# Patient Record
Sex: MALE | Race: WHITE | NOT HISPANIC OR LATINO | Employment: FULL TIME | URBAN - METROPOLITAN AREA
[De-identification: names, ages, dates, MRNs, and addresses within clinical notes are randomized per-mention and may not be internally consistent; named-entity substitution may affect disease eponyms.]

---

## 2018-01-05 ENCOUNTER — APPOINTMENT (OUTPATIENT)
Dept: GENERAL RADIOLOGY | Facility: HOSPITAL | Age: 58
End: 2018-01-05

## 2018-01-05 ENCOUNTER — HOSPITAL ENCOUNTER (EMERGENCY)
Facility: HOSPITAL | Age: 58
Discharge: LEFT BEFORE DISCHARGE | End: 2018-01-05

## 2018-01-05 VITALS
BODY MASS INDEX: 27.4 KG/M2 | OXYGEN SATURATION: 97 % | DIASTOLIC BLOOD PRESSURE: 74 MMHG | HEIGHT: 69 IN | TEMPERATURE: 96.7 F | SYSTOLIC BLOOD PRESSURE: 114 MMHG | WEIGHT: 185 LBS | RESPIRATION RATE: 16 BRPM | HEART RATE: 86 BPM

## 2018-01-05 PROCEDURE — 93010 ELECTROCARDIOGRAM REPORT: CPT | Performed by: INTERNAL MEDICINE

## 2018-01-05 PROCEDURE — 99211 OFF/OP EST MAY X REQ PHY/QHP: CPT

## 2018-01-05 PROCEDURE — 93005 ELECTROCARDIOGRAM TRACING: CPT

## 2018-01-05 RX ORDER — CLOPIDOGREL BISULFATE 75 MG/1
75 TABLET ORAL DAILY
COMMUNITY
End: 2020-02-07

## 2018-01-06 NOTE — ED TRIAGE NOTES
"Pt states \"it worked it's way out.\"  Pt describes having large bowel movement.  Pt states feeling much better and that he has decided to leave without being seen.  "

## 2019-09-23 ENCOUNTER — HOSPITAL ENCOUNTER (EMERGENCY)
Facility: HOSPITAL | Age: 59
Discharge: HOME | End: 2019-09-23
Attending: EMERGENCY MEDICINE | Admitting: EMERGENCY MEDICINE

## 2019-09-23 VITALS
WEIGHT: 190 LBS | RESPIRATION RATE: 16 BRPM | TEMPERATURE: 98.2 F | OXYGEN SATURATION: 97 % | BODY MASS INDEX: 28.14 KG/M2 | HEART RATE: 87 BPM | DIASTOLIC BLOOD PRESSURE: 84 MMHG | SYSTOLIC BLOOD PRESSURE: 121 MMHG | HEIGHT: 69 IN

## 2019-09-23 DIAGNOSIS — F32.A DEPRESSION, UNSPECIFIED DEPRESSION TYPE: Primary | ICD-10-CM

## 2019-09-23 DIAGNOSIS — Z86.59 HISTORY OF DEPRESSION: ICD-10-CM

## 2019-09-23 LAB
AMPHET+METHAMPHET UR QL: POSITIVE
BARBITURATES UR QL SCN: NEGATIVE
BENZODIAZ UR QL SCN: POSITIVE
CANNABINOIDS SERPL QL: NEGATIVE
COCAINE UR QL: NEGATIVE
ETHANOL BLD-MCNC: <10 MG/DL (ref 0–10)
ETHANOL UR QL: <0.01 %
METHADONE UR QL SCN: NEGATIVE
OPIATES UR QL: NEGATIVE
OXYCODONE UR QL SCN: NEGATIVE

## 2019-09-23 PROCEDURE — 80307 DRUG TEST PRSMV CHEM ANLYZR: CPT | Performed by: EMERGENCY MEDICINE

## 2019-09-23 PROCEDURE — 99283 EMERGENCY DEPT VISIT LOW MDM: CPT

## 2019-09-23 PROCEDURE — 90791 PSYCH DIAGNOSTIC EVALUATION: CPT | Performed by: SOCIAL WORKER

## 2019-09-23 PROCEDURE — 36415 COLL VENOUS BLD VENIPUNCTURE: CPT

## 2019-09-23 NOTE — ED NOTES
Pt arrives for a psych eval due to increasing panic attacks and depression. Pt denies SI HI.      Marybeth Thomas, RN  09/23/19 1124

## 2019-09-23 NOTE — DISCHARGE INSTRUCTIONS
Follow-up with IOP as discussed as scheduled with the mental health team today.  Continue all current medications.  Follow-up with your primary care provider as needed.  Return to the emergency department for worsening symptoms including but not limited to developing thoughts or plan of self-harm or harm to others.

## 2019-09-23 NOTE — ED PROVIDER NOTES
EMERGENCY DEPARTMENT ENCOUNTER    CHIEF COMPLAINT  Chief Complaint: anxiety and depression  History given by: pt  History limited by: nothing  Room Number: 10/10  PMD: Higin[facility]ozano [facility]    HPI:  Pt is a 58 y.o. male who presents to the ED via private vehicle complaining of worsening anxiety and depression starting in the past week. Also c/o decreased appetite, panic attacks, and admits to mild SI but states he would not do anything because it would hurt his family. Denies illicit drug use, ETOH use, HI, or hallucinations. Pt also reports he has been unable to get himself to work secondary to being unable to get out of bed.    Pt states he is lonely, and gets scared of being by himself at home, but has close relationship w/ sister, niece, and her sons, who he visits weekly. After pt was unable to see his nephews last week, he saw his psychiatrist for worsening anxiety and depression, and was started on Vilazodone, w/o relief. Also takes Xanax and Adderall, prescribed by Dr. Michael.    PAST MEDICAL HISTORY  Active Ambulatory Problems     Diagnosis Date Noted   • No Active Ambulatory Problems     Resolved Ambulatory Problems     Diagnosis Date Noted   • No Resolved Ambulatory Problems     Past Medical History:   Diagnosis Date   • Anxiety    • Asthma    • CAD (coronary artery disease)    • Chronic systolic CHF (congestive heart failure) (CMS/HCC)    • Depression    • Diabetes mellitus (CMS/HCC)    • Diastolic dysfunction    • Hyperlipidemia    • Hypertension    • Ischemic cardiomyopathy    • Mitral valve regurgitation    • Myocardial infarction (CMS/HCC)    • NSTEMI (non-ST elevated myocardial infarction) (CMS/[facility])    • Prostatic hypertrophy    • Seasonal allergies        PAST SURGICAL HISTORY  Past Surgical History:   Procedure Laterality Date   • CARDIAC SURGERY         FAMILY HISTORY  Family History   Problem Relation Age of Onset   • No Known Problems Mother    • No Known Problems  Father    • No Known Problems Sister        SOCIAL HISTORY  Social History     Socioeconomic History   • Marital status: Single     Spouse name: Not on file   • Number of children: Not on file   • Years of education: Not on file   • Highest education level: Not on file   Tobacco Use   • Smoking status: Never Smoker   Substance and Sexual Activity   • Alcohol use: No   • Drug use: No       ALLERGIES  Patient has no known allergies.    REVIEW OF SYSTEMS  Review of Systems   Constitutional: Positive for appetite change (decreased).   Psychiatric/Behavioral: Positive for suicidal ideas (mild). The patient is nervous/anxious.         (+) depression  (+) panic attacks       All systems reviewed and negative except for those discussed in HPI.    PHYSICAL EXAM  ED Triage Vitals [09/23/19 1125]   Temp Heart Rate Resp BP SpO2   98.2 °F (36.8 °C) 87 16 128/76 97 %      Temp src Heart Rate Source Patient Position BP Location FiO2 (%)   Tympanic -- -- -- --       Physical Exam   Constitutional: He is oriented to person, place, and time and well-developed, well-nourished, and in no distress. No distress.   HENT:   Head: Normocephalic.   Mouth/Throat: Mucous membranes are normal.   Eyes: EOM are normal.   Neck: Normal range of motion.   Cardiovascular: Normal rate and regular rhythm. Exam reveals no gallop and no friction rub.   No murmur heard.  Pulmonary/Chest: Effort normal. No respiratory distress. He has no wheezes. He has no rhonchi. He has no rales.   Abdominal: Soft. He exhibits no distension. There is no tenderness. There is no guarding.   Musculoskeletal: Normal range of motion. He exhibits no deformity.   Neurological: He is alert and oriented to person, place, and time. GCS score is 15.   moving all extremities, no focal deficits   Skin: Skin is warm and dry.   Psychiatric:   Emotional and anxious   Nursing note and vitals reviewed.      LAB RESULTS  Lab Results (last 24 hours)     Procedure Component Value Units  Date/Time    Ethanol [[id number]] Collected:  09/23/19 1214    Specimen:  Blood from Arm, Right Updated:  09/23/19 1238     Ethanol <10 mg/dL      Ethanol % <0.010 %     Urine Drug Screen - Urine, Clean Catch [[id number]]  (Abnormal) Collected:  09/23/19 1219    Specimen:  Urine, Clean Catch Updated:  09/23/19 1252     Amphet/Methamphet, Screen Positive     Barbiturates Screen, Urine Negative     Benzodiazepine Screen, Urine Positive     Cocaine Screen, Urine Negative     Opiate Screen Negative     THC, Screen, Urine Negative     Methadone Screen, Urine Negative     Oxycodone Screen, Urine Negative    Narrative:       Negative Thresholds For Drugs Screened:     Amphetamines               500 ng/ml   Barbiturates               200 ng/ml   Benzodiazepines            100 ng/ml   Cocaine                    300 ng/ml   Methadone                  300 ng/ml   Opiates                    300 ng/ml   Oxycodone                  100 ng/ml   THC                        50 ng/ml    The Normal Value for all drugs tested is negative. This report includes final unconfirmed screening results to be used for medical treatment purposes only. Unconfirmed results must not be used for non-medical purposes such as employment or legal testing. Clinical consideration should be applied to any drug of abuse test, particulary when unconfirmed results are used.          I ordered the above labs and reviewed the results.      PROGRESS AND CONSULTS  ED Course as of Sep 23 1508   Mon Sep 23, 2019   1233 Most recently evaluated in the emergency department in July 2019 at [place] for fatigue  [DC]   1456 The mental health team has evaluated the patient and he will start IOP on Wednesday.  [DC]   1458 Patient came in for evaluation of increasing depression and hopes of starting outpatient counseling.  He denied suicidal thoughts or plan and has no HI or auditory/visual hallucinations.  Recently started a new depression medication last week that has not  seemed to help at this time.  Patient has been evaluated by the mental health team, plans for IOP on Wednesday, patient advised to return to the emerge department for any developing thoughts or plans of self-harm or harm to others.  [DC]      ED Course User Index  [DC] Costa Cook MD       1204- HR 87. O2 sat 97%. /84. Discussed w/ pt and family plan to obtain UDS/ETOH and have pyschiatric evaluation. Pt understands and agrees with the plan, all questions answered. Ordered labs for further evaluation. Placed call out to [facility] for psychiatric evaluation.        MEDICAL DECISION MAKING  Results were reviewed/discussed with the patient and they were also made aware of online access. Pt also made aware that some labs, such as cultures, will not be resulted during ER visit and follow up with PMD is necessary.          DIAGNOSIS  Final diagnoses:   Depression, unspecified depression type   History of depression       DISPOSITION  Discharge to IOP starting Wednesday    Latest Documented Vital Signs:  As of 3:08 PM  BP- 121/84 HR- 87 Temp- 98.2 °F (36.8 °C) (Tympanic) O2 sat- 97%    --  Documentation assistance provided by alexis Benoit for Dr. Cook.  Information recorded by the scribe was done at my direction and has been verified and validated by me.     Janie Benoit  09/23/19 6966       Costa Cook MD  09/23/19 2555

## 2019-09-25 ENCOUNTER — OFFICE VISIT (OUTPATIENT)
Dept: PSYCHIATRY | Facility: HOSPITAL | Age: 59
End: 2019-09-25

## 2019-09-25 DIAGNOSIS — F33.1 MODERATE EPISODE OF RECURRENT MAJOR DEPRESSIVE DISORDER (HCC): Primary | ICD-10-CM

## 2019-09-25 PROCEDURE — S9480 INTENSIVE OUTPATIENT PSYCHIA: HCPCS | Performed by: COUNSELOR

## 2019-09-25 NOTE — PROGRESS NOTES
Wrap-up  Mood at 4 with 10 being the worse and found talking about his issues as most helpful today.    Feeling sad or empty   Fatigue or loss of energy  Easily tearing up/crying  Easily distracted  An increase/decrease in normal appetite  Feelings of worthlessness    No SI    Appropriate goals for later today

## 2019-09-25 NOTE — PROGRESS NOTES
Other     Information/activity     [id number] Art Therapy - The Wall therapeutic story, creation of image of own wall and processing    Instructor Domenic Bowie, LPAT     10:55 AM     Patient Response Good participation

## 2019-09-25 NOTE — PROGRESS NOTES
"Group Therapy [id number]  New to the group.  Attentive as others shared.  Shared of several medical issues, dislike for his current job and \"lonliness\" as primary issues leading to his current depression.  Group was very supportive.  "

## 2019-09-27 ENCOUNTER — OFFICE VISIT (OUTPATIENT)
Dept: PSYCHIATRY | Facility: HOSPITAL | Age: 59
End: 2019-09-27

## 2019-09-27 DIAGNOSIS — F33.1 MODERATE EPISODE OF RECURRENT MAJOR DEPRESSIVE DISORDER (HCC): Primary | ICD-10-CM

## 2019-09-27 PROCEDURE — S9480 INTENSIVE OUTPATIENT PSYCHIA: HCPCS

## 2019-09-27 NOTE — PROGRESS NOTES
Group Note:  Pt shared of being born shortly after his older brother  at 7yo, and experience of being raised by parents who were grieving and overprotective . Pt reports feeling he never learned how to take care of himself and has looked to others to take care of him.  Pt reports absent from group yesterday due to panic attacks, does not know what the trigger was.  He identified loneliness and an unfullfilling job as his two main stressors contributing to his depression.  Encouraged pt to focus on small goals he can work on this weekend towards alleviating those stressors.  Pt plans to spend weekend with family and his best friend.

## 2019-09-27 NOTE — PROGRESS NOTES
Teaching Record: Psych IOP class  Information / activity:  Coping with anxiety and panic attacks    Good participation      Delmy Espinoza, LCSW

## 2019-09-27 NOTE — PROGRESS NOTES
"Wrap Up:  Pt rates mood 7 out of 10 (with 10 being the worst) and reports \"talking about my issues\" was most helpful today.  Pt reports following symptoms:  Fatigue  Changes in sleep    Pt denies SI and has appropriate goals for the day.  He plans to return on Monday.   "

## 2019-09-30 ENCOUNTER — OFFICE VISIT (OUTPATIENT)
Dept: PSYCHIATRY | Facility: HOSPITAL | Age: 59
End: 2019-09-30

## 2019-09-30 DIAGNOSIS — F33.1 MODERATE EPISODE OF RECURRENT MAJOR DEPRESSIVE DISORDER (HCC): Primary | ICD-10-CM

## 2019-09-30 PROCEDURE — S9480 INTENSIVE OUTPATIENT PSYCHIA: HCPCS | Performed by: SOCIAL WORKER

## 2019-09-30 NOTE — PROGRESS NOTES
[id number] Psych IOP Class     Class topic: passive/aggressive/assertive communication     Class participation: good; pt actively engaged and shared insight into topic.

## 2019-09-30 NOTE — PROGRESS NOTES
Group note 10:15am-11:45am  Patient shared with the group his difficult weekend starting with a panic attack first thing yesterday morning.  He takes Adderal for his anxiety but that keeps him from sleeping.  He has difficulty being alone.  If he goes out he then dreads returning home.  He has been calling friends asking if he can spend the night.  Patient states that he is grieving the life he did not have-wanted to have a family.  He is still grieving the loss of his parents and he does not like his job, does not have a sense of purpose.  Group was supportive and gave suggestions.

## 2019-09-30 NOTE — PROGRESS NOTES
Mood at 5 with 10 being the worse and found talking and listening in group the most helpful.  Symptoms include:    Trouble with concentration, memory, or making decisions  Fatigue or loss of energy  An increase/decrease in normal appetite  Changes in normal sleep patterns (increase, decrease, or broken hoours of sleep)     No SI.  Appropriate plans for the evening.  Patient sees Dr. Rosemray Michael for meds.  He needs a therapist that takes his insurance.

## 2019-10-01 ENCOUNTER — OFFICE VISIT (OUTPATIENT)
Dept: PSYCHIATRY | Facility: HOSPITAL | Age: 59
End: 2019-10-01

## 2019-10-01 DIAGNOSIS — F33.1 MODERATE EPISODE OF RECURRENT MAJOR DEPRESSIVE DISORDER (HCC): Primary | ICD-10-CM

## 2019-10-01 NOTE — PROGRESS NOTES
Other     Information/activity     [id number] Art therapy- Bridge Drawing showing bridge between images of reason came to IOP and hopes for the future    Instructor Domenic Bowie, LPAT     4:23 PM     Patient Response Good participation

## 2019-10-01 NOTE — PROGRESS NOTES
"Wrap-up:  Patient left for an \"emergency\" doctor's appointment without completing a wrap-up form but did check in this morning.  No SI.  He wanted to see Dr. Michael as he will run out of Adderall before he can get a refill.  Gave patient a referral to Hugo Richardson LCSW for outpatient counseling.  "

## 2019-10-02 ENCOUNTER — OFFICE VISIT (OUTPATIENT)
Dept: PSYCHIATRY | Facility: HOSPITAL | Age: 59
End: 2019-10-02

## 2019-10-02 DIAGNOSIS — F33.1 MODERATE EPISODE OF RECURRENT MAJOR DEPRESSIVE DISORDER (HCC): Primary | ICD-10-CM

## 2019-10-02 PROCEDURE — S9480 INTENSIVE OUTPATIENT PSYCHIA: HCPCS | Performed by: SOCIAL WORKER

## 2019-10-02 NOTE — PROGRESS NOTES
[id number] Psych IOP Class:    Class topic: mindfulness; with guided imagery     Class participation: good; pt actively engaged; shared experience of imagery

## 2019-10-02 NOTE — PROGRESS NOTES
"Group note 10:15am-11:30am:  Patient shared his story with the group, about not ever getting over his parents death when he was in his early 20's, having numerous health problems and \"hating\" his current job.  He saw his psychiatrist yesterday and she made a med change.  He did not sleep last night and is concerned about that.  "

## 2019-10-02 NOTE — PROGRESS NOTES
Mood at 3 with 10 being the worse and found listening to others the most helpful.  Symptoms include:    Fatigue or loss of energy  An increase/decrease in normal appetite  Changes in normal sleep patterns (increase, decrease, or broken hoours of sleep)     No SI.  Appropriate goals.

## 2019-10-03 NOTE — PLAN OF CARE
Problem:  Patient Care Overview (Adult)  Goal: Interdisciplinary Rounds/Family Conference  Patient is still having difficulties with meds-had an appointment with Dr. Michael this week for a med change.  He is not sleeping and struggles with fatigue.  Participating in groups and classes.  Gave referral for Hugo Richardson LCSW for outpatient therapy.   10/03/19 8974   Interdisciplinary Rounds/Family Conf   Participants social work

## 2019-10-04 ENCOUNTER — OFFICE VISIT (OUTPATIENT)
Dept: PSYCHIATRY | Facility: HOSPITAL | Age: 59
End: 2019-10-04

## 2019-10-04 DIAGNOSIS — F33.1 MODERATE EPISODE OF RECURRENT MAJOR DEPRESSIVE DISORDER (HCC): Primary | ICD-10-CM

## 2019-10-04 PROCEDURE — S9480 INTENSIVE OUTPATIENT PSYCHIA: HCPCS

## 2019-10-04 NOTE — PROGRESS NOTES
Mood at 5 with 10 being the worse and found the program helpful today.  Symptoms include:    Fatigue or loss of energy  An increase/decrease in normal appetite  Changes in normal sleep patterns (increase, decrease, or broken hoours of sleep)     No SI.  Spoke with patient again about his taking Xanax, Klonopin and over the counter sleep meds together.

## 2019-10-04 NOTE — PROGRESS NOTES
"Group Note:  Pt shared of feeling better today, but not looking forward to the weekend because he has no plans.  Continues to question why he is having panic attacks and depression.  Struggles with loneliness and feels \"pathetic\" when he reaches out to friends.  Encouraged pt to focus on small steps he can focus on that work towards his goals.  He was supportive of peers and engaged in group process.    "

## 2019-10-04 NOTE — PROGRESS NOTES
[id number] Psych IOP Class:    Class topic: self-compassion with CARMELITA talk video     Class participation: good; pt shared personal experience with topic and struggles with self-criticism

## 2019-10-07 ENCOUNTER — OFFICE VISIT (OUTPATIENT)
Dept: PSYCHIATRY | Facility: HOSPITAL | Age: 59
End: 2019-10-07

## 2019-10-07 DIAGNOSIS — F33.1 MODERATE EPISODE OF RECURRENT MAJOR DEPRESSIVE DISORDER (HCC): Primary | ICD-10-CM

## 2019-10-07 PROCEDURE — S9480 INTENSIVE OUTPATIENT PSYCHIA: HCPCS | Performed by: COUNSELOR

## 2019-10-07 NOTE — PROGRESS NOTES
Wrap-up  Mood at 3  with 10 being the worse and found all aspects of IOP to be helpful today    Fatigue or loss of energy  A decrease in normal appetite  Changes in normal sleep patterns ( decrease, or broken hoours of sleep)    No SI and appropriate goals for later today.

## 2019-10-07 NOTE — PROGRESS NOTES
Teaching Record: Psych IOP Class  Information / activity:  Identifying Emotions    Good participation      Delmy Espinoza, LCSW

## 2019-10-07 NOTE — PROGRESS NOTES
Group therapy 10-30  Discussed having a good weekend and positive steps:  Reached out to a friend and went hiking, to nephew's ball game and reached out to several friends via brief, positive texts.  Pt got several positive responses.  States he has started taking a dose of Adderall in the morning and this seems to be helping.  Was attentive and related to several others in the group.

## 2019-10-08 ENCOUNTER — OFFICE VISIT (OUTPATIENT)
Dept: PSYCHIATRY | Facility: HOSPITAL | Age: 59
End: 2019-10-08

## 2019-10-08 DIAGNOSIS — F33.1 MODERATE EPISODE OF RECURRENT MAJOR DEPRESSIVE DISORDER (HCC): Primary | ICD-10-CM

## 2019-10-08 PROCEDURE — S9480 INTENSIVE OUTPATIENT PSYCHIA: HCPCS | Performed by: COUNSELOR

## 2019-10-08 NOTE — PROGRESS NOTES
"Group therapy 0900-1030  Discussed history of \"always feeling ugly\" and this low self-esteem thwarted his social growth.  Reported as he gets older, does not worry as much about this but regrets his inability to deal with relationships in a healthy manner and \"never got  because I was afraid they would abandon me\".  Was attentive and offered much hope and support to other group members today.  "

## 2019-10-08 NOTE — PROGRESS NOTES
Wrap-up  Mood at 3  with 10 being the worse and found group therapy to be most helpful today    Fatigue or loss of energy  An increase/decrease in normal appetite  Changes in normal sleep patterns (increase, decrease, or broken hoours of sleep)  Increased nervous feelings/anxiety    No SI    Positive goals for later today

## 2019-10-09 ENCOUNTER — OFFICE VISIT (OUTPATIENT)
Dept: PSYCHIATRY | Facility: HOSPITAL | Age: 59
End: 2019-10-09

## 2019-10-09 DIAGNOSIS — F33.1 MODERATE EPISODE OF RECURRENT MAJOR DEPRESSIVE DISORDER (HCC): Primary | ICD-10-CM

## 2019-10-09 PROCEDURE — S9480 INTENSIVE OUTPATIENT PSYCHIA: HCPCS | Performed by: COUNSELOR

## 2019-10-09 NOTE — PROGRESS NOTES
Wrap-up  Mood at 4 with 10 being the worse amd found talking in group as most helpful.      Feeling sad or empty   Fatigue or loss of energy  Changes in normal sleep patterns (increase, decrease, or broken hoours of sleep)  Increased nervous feelings/anxiety    No SI    Goals:  Attend [facility] tonight and make calls to work re: PTO    To return on 10/10

## 2019-10-09 NOTE — PLAN OF CARE
Problem:  Patient Care Overview (Adult)  Goal: Interdisciplinary Rounds/Family Conference  Outcome: Ongoing (interventions implemented as appropriate)   10/09/19 0942   Interdisciplinary Rounds/Family Conf   Participants social work     Continues in IOP dealing with depression.  Reports some improvement in mood but continues with fatigue and anxiety.  Not working and is experiencing financial challenges which cause an increase in anxiety.  Continues to reach out to friends and a few family members.  No SI.  His yanely is important to him.  Pt has been given resource for OP therapist and sees Dr. Michael for meds.

## 2019-10-09 NOTE — PROGRESS NOTES
Other     Information/activity     [id number] Art Therapy - Self Collage using magazine pictures and to include what the group may not know    Instructor Domenic Bowie, ISADORAT     11:08 AM     Patient Response Good participation

## 2019-10-09 NOTE — PROGRESS NOTES
Group therapy   Expressed increased anxiety today as his finances are depleting and he has not heard back from work about getting assistance from a work PTO pool.  Pt indecisive re:  Going back to work too soon or exploring other options to get money he needs to live the next few weeks.  Group offered support and suggestions.  Pt was attentive as others shared.

## 2019-10-11 ENCOUNTER — OFFICE VISIT (OUTPATIENT)
Dept: PSYCHIATRY | Facility: HOSPITAL | Age: 59
End: 2019-10-11

## 2019-10-11 DIAGNOSIS — F33.1 MODERATE EPISODE OF RECURRENT MAJOR DEPRESSIVE DISORDER (HCC): Primary | ICD-10-CM

## 2019-10-11 PROCEDURE — S9480 INTENSIVE OUTPATIENT PSYCHIA: HCPCS

## 2019-10-11 NOTE — PROGRESS NOTES
Wrap Up:  Pt rates mood 4 out of 10 (with 10 being the worst) and reports talking about work issues was most helpful today.  Pt reports following symptoms:  Fatigue  Decreased appetite  Changes in sleep  Increased anxiety    Pt denies SI and has appropriate goals for the day.

## 2019-10-11 NOTE — PROGRESS NOTES
Teaching Record: Psych IOP Class  Information / activity:  Boundaries and The Drama Carpenter    Good participation      Delmy Espinoza, LCSW

## 2019-10-11 NOTE — PROGRESS NOTES
"Group Note:  Pt reports he missed IOP yesterday because early Thursday morning he could not sleep so he took his Xanax and ZQuil and then was too drowsy to drive.  He also shared that he is very upset about idea of returning to work and is considering going to to private practice and has ideas for side jobs as well.  Despite having this plan, pt continues to feel stuck and states \"I don't know what to do.\"  He received support from group members, and was encouraged to make a list of things he can start doing to work towards goal of private practice.   "

## 2019-10-14 ENCOUNTER — OFFICE VISIT (OUTPATIENT)
Dept: PSYCHIATRY | Facility: HOSPITAL | Age: 59
End: 2019-10-14

## 2019-10-14 DIAGNOSIS — F33.1 MODERATE EPISODE OF RECURRENT MAJOR DEPRESSIVE DISORDER (HCC): Primary | ICD-10-CM

## 2019-10-14 PROCEDURE — S9480 INTENSIVE OUTPATIENT PSYCHIA: HCPCS | Performed by: COUNSELOR

## 2019-10-14 NOTE — PROGRESS NOTES
Wrap-up  Mood at 7  with 10 being the worse and found talking things through was most helpful today.    Feeling sad or empty   Fatigue or loss of energy  Increased Irritabliliy  An increase/decrease in normal appetite  Changes in normal sleep patterns (increase, decrease, or broken hoours of sleep)  Feelings of increased guilt     No SI    Appropriate goals for later today.    Planning d/c from [facility] on 10/15.

## 2019-10-14 NOTE — PROGRESS NOTES
"Group therapy [id number]  Shared of a stressful weekend where he actually spent most of the time caretaking a former girlfriend and her children.  With the help of the group, pt able to recap the situation and look at what he learned----\"that she is toxic\" and he needs to stay away from her.   Pt also reported he learned \"being alone is not as bad as being with this person\"  Was attentive as others shared today.  "

## 2019-10-14 NOTE — PROGRESS NOTES
[id number] Psych IOP Class     Class topic: letter to self     Class participation: good; pt shared letter and related well to peers.

## 2019-10-16 ENCOUNTER — OFFICE VISIT (OUTPATIENT)
Dept: PSYCHIATRY | Facility: HOSPITAL | Age: 59
End: 2019-10-16

## 2019-10-16 DIAGNOSIS — F33.1 MODERATE EPISODE OF RECURRENT MAJOR DEPRESSIVE DISORDER (HCC): Primary | ICD-10-CM

## 2019-10-16 PROCEDURE — S9480 INTENSIVE OUTPATIENT PSYCHIA: HCPCS | Performed by: COUNSELOR

## 2019-10-16 NOTE — PROGRESS NOTES
Discharge Summary    Jack attended  12 IOP Sessions         Registration Date 9/25/19  Discharge Date  10/16/2019       Summary of Treatment and Progress towards goals:   Active in classes and groups.  Reporting some decrease in depressive symptoms and slight increase in mood.  Reaching out more to family and friends.  Active in his [employer] which is very important to him.  Has OP providers.    Diagnostic Impression:   Moderate episode of recurrent, major depressive disorder      Current Medications:  Current Outpatient Medications   Medication Sig Dispense Refill   • Atorvastatin Calcium (LIPITOR PO) Take  by mouth.     • clopidogrel (PLAVIX) 75 MG tablet Take 75 mg by mouth Daily.       No current facility-administered medications for this visit.        Referrals/ Appointments :   Rosemary Michael MD   11/20/19  Will call Hugo Richardson LCSW for an appointment as finances allow.        CESAR Blake

## 2019-10-16 NOTE — PROGRESS NOTES
Other     Information/activity     [id number] Art Therapy - Bridge Drawing using markers on paper. Processed with group.    Instructor CUBA Hansen     4:43 PM     Patient Response Good participation

## 2019-10-16 NOTE — PROGRESS NOTES
"Group therapy   Reported he was sad to be leaving the group today.  Shared of stressors of car problems and negative interaction with a \"friend\" who is not really much of a friend yesterday.  Gaining insight into why he continues to interact with this person.  Has a good aftercare plan of easing back into work for a few hours daily and while he continues to explore other job opportunities.    "

## 2019-10-17 ENCOUNTER — APPOINTMENT (OUTPATIENT)
Dept: PSYCHIATRY | Facility: HOSPITAL | Age: 59
End: 2019-10-17

## 2019-10-18 ENCOUNTER — APPOINTMENT (OUTPATIENT)
Dept: PSYCHIATRY | Facility: HOSPITAL | Age: 59
End: 2019-10-18

## 2019-10-21 ENCOUNTER — APPOINTMENT (OUTPATIENT)
Dept: PSYCHIATRY | Facility: HOSPITAL | Age: 59
End: 2019-10-21

## 2019-10-22 ENCOUNTER — APPOINTMENT (OUTPATIENT)
Dept: PSYCHIATRY | Facility: HOSPITAL | Age: 59
End: 2019-10-22

## 2019-12-26 ENCOUNTER — TRANSCRIBE ORDERS (OUTPATIENT)
Dept: ADMINISTRATIVE | Facility: HOSPITAL | Age: 59
End: 2019-12-26

## 2019-12-26 DIAGNOSIS — G71.3: ICD-10-CM

## 2019-12-26 DIAGNOSIS — K76.0 FATTY LIVER: Primary | ICD-10-CM

## 2020-01-13 ENCOUNTER — TRANSCRIBE ORDERS (OUTPATIENT)
Dept: ADMINISTRATIVE | Facility: HOSPITAL | Age: 60
End: 2020-01-13

## 2020-01-13 DIAGNOSIS — K76.0 FATTY LIVER: Primary | ICD-10-CM

## 2020-01-20 ENCOUNTER — HOSPITAL ENCOUNTER (OUTPATIENT)
Dept: ULTRASOUND IMAGING | Facility: HOSPITAL | Age: 60
Discharge: HOME | End: 2020-01-20

## 2020-02-07 RX ORDER — NITROGLYCERIN 0.4 MG/1
0.4 TABLET SUBLINGUAL
COMMUNITY
Start: 2019-01-24

## 2020-02-07 RX ORDER — TAMSULOSIN HYDROCHLORIDE 0.4 MG/1
0.4 CAPSULE ORAL DAILY
COMMUNITY
Start: 2018-07-26

## 2020-02-07 RX ORDER — ALPRAZOLAM 2 MG/1
2 TABLET ORAL NIGHTLY
COMMUNITY

## 2020-02-07 RX ORDER — CARVEDILOL 12.5 MG/1
12.5 TABLET ORAL
COMMUNITY
Start: 2019-07-31

## 2020-02-07 RX ORDER — ATORVASTATIN CALCIUM 80 MG/1
TABLET, FILM COATED ORAL
COMMUNITY
Start: 2018-11-26

## 2020-02-07 RX ORDER — PAROXETINE 30 MG/1
90 TABLET, FILM COATED ORAL EVERY MORNING
COMMUNITY

## 2020-02-07 RX ORDER — SPIRONOLACTONE 25 MG/1
25 TABLET ORAL DAILY
COMMUNITY
Start: 2019-02-28

## 2020-02-07 RX ORDER — GLIPIZIDE 2.5 MG/1
2.5 TABLET, EXTENDED RELEASE ORAL DAILY
COMMUNITY
Start: 2019-06-24

## 2020-02-07 RX ORDER — LISINOPRIL 5 MG/1
5 TABLET ORAL DAILY
COMMUNITY
Start: 2019-02-28

## 2020-02-07 RX ORDER — DEXTROAMPHETAMINE SACCHARATE, AMPHETAMINE ASPARTATE, DEXTROAMPHETAMINE SULFATE AND AMPHETAMINE SULFATE 2.5; 2.5; 2.5; 2.5 MG/1; MG/1; MG/1; MG/1
10 TABLET ORAL DAILY
COMMUNITY

## 2020-02-14 ENCOUNTER — HOSPITAL ENCOUNTER (OUTPATIENT)
Dept: CT IMAGING | Facility: HOSPITAL | Age: 60
Discharge: HOME | End: 2020-02-14
Admitting: RADIOLOGY

## 2020-02-14 VITALS
TEMPERATURE: 97 F | WEIGHT: 185 LBS | BODY MASS INDEX: 27.4 KG/M2 | OXYGEN SATURATION: 95 % | RESPIRATION RATE: 18 BRPM | DIASTOLIC BLOOD PRESSURE: 59 MMHG | HEART RATE: 79 BPM | SYSTOLIC BLOOD PRESSURE: 103 MMHG | HEIGHT: 69 IN

## 2020-02-14 DIAGNOSIS — K76.0 FATTY LIVER: ICD-10-CM

## 2020-02-14 DIAGNOSIS — R79.89 ELEVATED LFTS: Primary | ICD-10-CM

## 2020-02-14 LAB
INR PPP: 1.1 (ref 0.8–1.2)
PLATELET # BLD AUTO: 181 10*3/MM3 (ref 140–450)
PROTHROMBIN TIME: 13 SECONDS (ref 12.8–15.2)

## 2020-02-14 PROCEDURE — 85610 PROTHROMBIN TIME: CPT

## 2020-02-14 PROCEDURE — 88313 SPECIAL STAINS GROUP 2: CPT | Performed by: SPECIALIST

## 2020-02-14 PROCEDURE — 85049 AUTOMATED PLATELET COUNT: CPT | Performed by: RADIOLOGY

## 2020-02-14 PROCEDURE — 88307 TISSUE EXAM BY PATHOLOGIST: CPT | Performed by: SPECIALIST

## 2020-02-14 PROCEDURE — 77012 CT SCAN FOR NEEDLE BIOPSY: CPT

## 2020-02-14 RX ORDER — SODIUM CHLORIDE 0.9 % (FLUSH) 0.9 %
1-10 SYRINGE (ML) INJECTION AS NEEDED
Status: DISCONTINUED | OUTPATIENT
Start: 2020-02-14 | End: 2020-02-15 | Stop reason: HOSPADM

## 2020-02-14 RX ORDER — LIDOCAINE HYDROCHLORIDE 10 MG/ML
20 INJECTION, SOLUTION INFILTRATION; PERINEURAL ONCE
Status: DISCONTINUED | OUTPATIENT
Start: 2020-02-14 | End: 2020-02-15 | Stop reason: HOSPADM

## 2020-02-14 RX ORDER — SODIUM CHLORIDE 0.9 % (FLUSH) 0.9 %
3 SYRINGE (ML) INJECTION EVERY 12 HOURS SCHEDULED
Status: DISCONTINUED | OUTPATIENT
Start: 2020-02-14 | End: 2020-02-15 | Stop reason: HOSPADM

## 2020-02-14 NOTE — DISCHARGE INSTRUCTIONS
EDUCATION /DISCHARGE INSTRUCTIONS  CT/US guided biopsy:  A biopsy is a procedure done to remove tissue for further analysis.  Before images are taken to locate the target area.  Images can be obtained using ultrasound, CT or MRI.  A physician will clean your skin with antiseptic soap, place a sterile towel around the site and administer a local anesthetic to numb the area.  The physician will then insert a special needle.  Sometimes images are taken of the needle after it is inserted to ensure the needle is in the correct area to be biopsied.   A sample is obtained and sent to the laboratory for study.  Occasionally the laboratory is unable to make a diagnosis from the sample and the procedure may need to be repeated.  Within a week the radiologist will send a report to your physician.  A pathologist will also examine the tissue and send a report.    Risks of the procedure include but are not limited to:   *  Bleeding    *  Infection   *  Puncture of surrounding organs *  Death     *  Lung collapse if the biopsy is near the chest which may require insertion of a      chest tube to re-inflate the lung if severe.    Benefits of the procedure:  Using x-ray helps to locate the area that requires a biopsy. The procedure is less invasive than a surgical procedure, there are no large incisions and it does not require anesthesia.    Alternatives to the procedure:  A biopsy can be performed surgically.  Risks of a surgical biopsy include exposure to anesthesia, infection, excessive bleeding and injury to abdominal organs.  A benefit of surgical biopsy is the ability to see the area to be biopsied and remove of a larger piece of tissue.    THIS EDUCATION INFORMATION WAS REVIEWED PRIOR TO PROCEDURE AND CONSENT. Patient initials__________________Time____0815_______________    Post Procedure:    *  Expect the biopsy site may be tender up to one week.    *  Rest today (no pushing pulling or straining).   *  Slowly increase  activity tomorrow.    *  If you received sedation do not drive for 24 hours.   *  Keep dressing clean and dry.   *  Leave dressing on puncture site for 24 hours.    *  You may shower when dressing removed.  Call your doctor if experiencing:   *  Signs of infection such as redness, swelling, excessive pain and / or foul        smelling drainage from the puncture site.   *  Chills or fever over 101 degrees (by mouth).   *  Unrelieved pain.   *  Any new or severe symptoms.   *  If experiencing sudden / severe shortness of breath or chest pain go to the       nearest emergency room.   Following the procedure:     Follow-up with the ordering physician as directed.    Continue to take other medications as directed by your physician unless    otherwise instructed.   If applicable, resume taking your blood thinners or Aspirin on Saturday February 15th, 2020 after 12noon    If you have any concerns please call the Radiology Nurses Desk at [phone].  You are the most important factor in your recovery.  Follow the above instructions carefully.

## 2020-02-14 NOTE — H&P
Name: Jack Lopez ADMIT: 2020   : 1960  PCP: Higinio Lozano MD    MRN: [id number] LOS: 0 days   AGE/SEX: 59 y.o. male  ROOM: Room/bed info not found       Chief complaint   Patient is a 59 y.o. male presents with abnormal liver enzymes.     Past Surgical History:  Past Surgical History:   Procedure Laterality Date   • CARDIAC SURGERY      CABG 4 vessel        Past Medical History:  Past Medical History:   Diagnosis Date   • Anxiety    • Asthma    • CAD (coronary artery disease)    • Chronic systolic CHF (congestive heart failure) (CMS/HCC)    • Depression    • Diabetes mellitus (CMS/HCC)    • Diastolic dysfunction    • Hyperlipidemia    • Hypertension    • Ischemic cardiomyopathy    • Mitral valve regurgitation    • Myocardial infarction (CMS/HCC)    • NSTEMI (non-ST elevated myocardial infarction) (CMS/HCC)    • Prostatic hypertrophy     benign   • Seasonal allergies        Home Medications:    (Not in a hospital admission)    Allergies:  Metformin    Family History:  Family History   Problem Relation Age of Onset   • No Known Problems Mother    • No Known Problems Father    • No Known Problems Sister        Social History:  Social History     Tobacco Use   • Smoking status: Never Smoker   Substance Use Topics   • Alcohol use: No   • Drug use: No        Objective     Physical Exam:   OK for Biopsy    Vital Signs  Temp:  [97 °F (36.1 °C)] 97 °F (36.1 °C)  Heart Rate:  [84] 84  Resp:  [16] 16  BP: (116)/(75) 116/75    Anticipated Surgical Procedure:  CT Guided Liver Biopsy    The risks, benefits and alternatives of this procedure have been discussed with the patient or responsible party: Yes        Terry Heard MD  20  8:27 AM

## 2020-02-14 NOTE — NURSING NOTE
Discharge instructions discussed and understood by patient and family. Ewa D&I. No c/o pain. No distress.

## 2020-02-14 NOTE — NURSING NOTE
Patient had a liver BX this a.m. Called c/o extreme pain and bleeding from BX site. Instructed patient to go to ER immediately Via ambulance or family member.

## 2020-02-15 ENCOUNTER — TELEPHONE (OUTPATIENT)
Dept: RADIOLOGY | Facility: HOSPITAL | Age: 60
End: 2020-02-15

## 2020-02-15 NOTE — TELEPHONE ENCOUNTER
Pt states he hurt like crazy after he woke up from his nap.  He was instructed by JAZMINE Jonas RN to go to ER for severe pain and bleeding.  Instead he took non-aspirin medication and didn't go to ER because he knew they would keep him and he didn't want to stay overnight.  Pt states today he is feeling better and has not noted any further bleeding on bandage.  He has not taken bandage off, but he feels better. Encg'd pt to call back if he has any issues.

## 2020-02-17 ENCOUNTER — APPOINTMENT (OUTPATIENT)
Dept: CT IMAGING | Facility: HOSPITAL | Age: 60
End: 2020-02-17

## 2020-02-19 LAB
CYTO UR: NORMAL
LAB AP CASE REPORT: NORMAL
LAB AP CLINICAL INFORMATION: NORMAL
LAB AP DIAGNOSIS COMMENT: NORMAL
PATH REPORT.FINAL DX SPEC: NORMAL
PATH REPORT.GROSS SPEC: NORMAL

## 2020-04-08 ENCOUNTER — APPOINTMENT (OUTPATIENT)
Dept: GENERAL RADIOLOGY | Facility: HOSPITAL | Age: 60
End: 2020-04-08

## 2020-04-08 ENCOUNTER — HOSPITAL ENCOUNTER (EMERGENCY)
Facility: HOSPITAL | Age: 60
Discharge: HOME | End: 2020-04-08
Attending: EMERGENCY MEDICINE | Admitting: EMERGENCY MEDICINE

## 2020-04-08 VITALS
HEART RATE: 74 BPM | SYSTOLIC BLOOD PRESSURE: 117 MMHG | DIASTOLIC BLOOD PRESSURE: 75 MMHG | TEMPERATURE: 98.8 F | OXYGEN SATURATION: 94 % | RESPIRATION RATE: 17 BRPM | HEIGHT: 69 IN | BODY MASS INDEX: 26.66 KG/M2 | WEIGHT: 180 LBS
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
## 2020-04-08 DIAGNOSIS — S39.012A ACUTE MYOFASCIAL STRAIN OF LUMBAR REGION, INITIAL ENCOUNTER: ICD-10-CM

## 2020-04-08 DIAGNOSIS — S16.1XXA ACUTE CERVICAL MYOFASCIAL STRAIN, INITIAL ENCOUNTER: ICD-10-CM

## 2020-04-08 DIAGNOSIS — S20.219A CONTUSION OF CHEST WALL, UNSPECIFIED LATERALITY, INITIAL ENCOUNTER: ICD-10-CM

## 2020-04-08 PROCEDURE — 72110 X-RAY EXAM L-2 SPINE 4/>VWS: CPT

## 2020-04-08 PROCEDURE — 72050 X-RAY EXAM NECK SPINE 4/5VWS: CPT

## 2020-04-08 PROCEDURE — 99283 EMERGENCY DEPT VISIT LOW MDM: CPT

## 2020-04-08 PROCEDURE — 71046 X-RAY EXAM CHEST 2 VIEWS: CPT

## 2020-04-08 RX ORDER — NAPROXEN SODIUM 550 MG/1
550 TABLET ORAL 2 TIMES DAILY PRN
Qty: 20 TABLET | Refills: 0 | Status: SHIPPED | OUTPATIENT
Start: 2020-04-08

## 2020-04-08 RX ORDER — METAXALONE 800 MG/1
800 TABLET ORAL 3 TIMES DAILY
Qty: 15 TABLET | Refills: 0 | Status: SHIPPED | OUTPATIENT
Start: 2020-04-08
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
## 2020-04-08 NOTE — DISCHARGE INSTRUCTIONS
Take medications as prescribed.  Return to the emergency department for worsening pain, difficulty breathing, abdominal pain, numbness/weakness in your arms or legs, headache, dizziness, or other concern.
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
## 2021-06-09 ENCOUNTER — HOSPITAL ENCOUNTER (EMERGENCY)
Facility: HOSPITAL | Age: 61
Discharge: HOME | End: 2021-06-09
Attending: EMERGENCY MEDICINE | Admitting: EMERGENCY MEDICINE

## 2021-06-09 VITALS
OXYGEN SATURATION: 98 % | HEIGHT: 69 IN | TEMPERATURE: 97.7 F | SYSTOLIC BLOOD PRESSURE: 130 MMHG | HEART RATE: 108 BPM | BODY MASS INDEX: 26.64 KG/M2 | WEIGHT: 179.9 LBS | RESPIRATION RATE: 16 BRPM | DIASTOLIC BLOOD PRESSURE: 85 MMHG

## 2021-06-09 DIAGNOSIS — H61.23 BILATERAL HEARING LOSS DUE TO CERUMEN IMPACTION: Primary | ICD-10-CM

## 2021-06-09 PROCEDURE — 69209 REMOVE IMPACTED EAR WAX UNI: CPT

## 2021-06-09 PROCEDURE — 99282 EMERGENCY DEPT VISIT SF MDM: CPT

## 2021-06-09 NOTE — ED TRIAGE NOTES
"Patient to ED with c/o hearing loss in his right ear for one day. He describes the sensation as \"water\" in his ear. Pt was seen at [facility] prior to arrival where the ear was irrigated and wax was removed. He also reports difficulty balancing.     Patient was wearing a face mask when I entered the room and they continued to wear a mask throughout our encounter. I wore PPE including gloves, eye protection, and a surgical mask whenever I was in the room with patient. Hand hygiene performed.  "

## 2021-06-10 NOTE — ED PROVIDER NOTES
EMERGENCY DEPARTMENT ENCOUNTER    Room Number:  37/37  Date of encounter:  6/9/2021  PCP: Provider, No Known  Historian: Patient      HPI:  Chief Complaint: Bilateral hearing loss  A complete HPI/ROS/PMH/PSH/SH/FH are unobtainable due to: None    Context: Jack Lopez is a 60 y.o. male who presents to the ED c/o bilateral hearing loss.  He woke up with it this morning.  Symptoms are constant.  He has very little hearing in the right ear and decreased hearing in the left ear.  He states that he feels some unsteadiness when he walks.  No headache, vision change, difficulty speaking.  No weakness in his arms or legs.    He went to an urgent care and they attempted to irrigate his ears due to visualized cerumen impaction.  They decided to come to the emergency department for further evaluation.      PAST MEDICAL HISTORY  Active Ambulatory Problems     Diagnosis Date Noted   • No Active Ambulatory Problems     Resolved Ambulatory Problems     Diagnosis Date Noted   • No Resolved Ambulatory Problems     Past Medical History:   Diagnosis Date   • Anxiety    • Asthma    • CAD (coronary artery disease)    • Chronic systolic CHF (congestive heart failure) (CMS/[facility])    • Depression    • Diabetes mellitus (CMS/HCC)    • Diastolic dysfunction    • Hyperlipidemia    • Hypertension    • Ischemic cardiomyopathy    • Mitral valve regurgitation    • Myocardial infarction (CMS/HCC)    • NSTEMI (non-ST elevated myocardial infarction) (CMS/[facility])    • Prostatic hypertrophy    • Seasonal allergies          PAST SURGICAL HISTORY  Past Surgical History:   Procedure Laterality Date   • CARDIAC SURGERY      CABG 4 vessel 2010         FAMILY HISTORY  Family History   Problem Relation Age of Onset   • No Known Problems Mother    • No Known Problems Father    • No Known Problems Sister          SOCIAL HISTORY  Social History     Socioeconomic History   • Marital status: Single     Spouse name: Not on file   • Number of children: Not on file    • Years of education: Not on file   • Highest education level: Not on file   Tobacco Use   • Smoking status: Never Smoker   Substance and Sexual Activity   • Alcohol use: No   • Drug use: No         ALLERGIES  Metformin        REVIEW OF SYSTEMS  Review of Systems     All systems reviewed and negative except for those discussed in HPI.       PHYSICAL EXAM    I have reviewed the triage vital signs and nursing notes.    ED Triage Vitals   Temp Heart Rate Resp BP SpO2   06/09/21 1744 06/09/21 1744 06/09/21 1834 06/09/21 1747 06/09/21 1744   97.7 °F (36.5 °C) 108 16 154/96 96 %      Temp src Heart Rate Source Patient Position BP Location FiO2 (%)   06/09/21 1744 -- 06/09/21 1747 -- --   Tympanic  Sitting         Physical Exam  GENERAL: not distressed  HENT: nares patent, bilateral cerumen impaction  EYES: no scleral icterus  CV: regular rhythm, regular rate  RESPIRATORY: normal effort  MUSCULOSKELETAL: no deformity  NEURO:   Recent and remote memory functions are normal. The patient is attentive with normal concentration. Language is fluent. Speech is clear. The speech is non-dysarthric. Fund of knowledge is normal.   Symmetric smile with no facial droop.  Eyes close shut strongly bilaterally.  Symmetric eyebrow raise bilaterally.  EOMI, PERRL  CN II-XII grossly normal otherwise.  5/5 strength to extremities.  No pronator drift.  Intact FNF.  No meningismus.  Normal gait  SKIN: warm, dry        LAB RESULTS  No results found for this or any previous visit (from the past 24 hour(s)).    Ordered the above labs and independently reviewed the results.        RADIOLOGY  No Radiology Exams Resulted Within Past 24 Hours    I ordered the above noted radiological studies. Reviewed by me and discussed with radiologist.  See dictation for official radiology interpretation.      PROCEDURES    Ear Cerumen Removal Instrumentation    Date/Time: 6/9/2021 8:37 PM  Performed by: Clay Land II, MD  Authorized by: Clay Land  Ky KRUSE MD     Consent:     Consent obtained:  Verbal    Consent given by:  Patient    Risks discussed:  Bleeding, pain, TM perforation and incomplete removal    Alternatives discussed:  No treatment and delayed treatment  Procedure details:     Location:  L ear and R ear    Procedure type: curette    Post-procedure details:     Inspection:  Bleeding    Hearing quality:  Improved    Patient tolerance of procedure:  Tolerated well, no immediate complications          MEDICATIONS GIVEN IN ER    Medications - No data to display      PROGRESS, DATA ANALYSIS, CONSULTS, AND MEDICAL DECISION MAKING        Patient reports having some dizziness that is intermittent and occurs only with standing.  He has a normal and reassuring neurological examination.  I do not believe this is consistent with a stroke, especially in the setting of bilateral hearing loss with a good alternate explanation, namely cerumen impaction.  I was able to remove a fair amount of cerumen.  However, he continues to have some significant retained cerumen.  I am timid to become more aggressive with this as I do not want to cause any damage to the tympanic membrane.  I have sent him a prescription for home.  He will follow up with ENT.               PPE: Both the patient and I wore a surgical mask throughout the entire patient encounter. I wore protective goggles.     AS OF 20:26 EDT VITALS:    BP - 154/96  HR - 108  TEMP - 97.7 °F (36.5 °C) (Tympanic)  O2 SATS - 96%        DIAGNOSIS  Final diagnoses:   Bilateral hearing loss due to cerumen impaction         DISPOSITION  DISCHARGE    FOLLOW-UP  [facility] Emergency Department  [address] [address] [phone]  Go to   If symptoms worsen    Shay Thomas MD  [address] [id number] [phone]    Schedule an appointment as soon as possible for a visit       Candido Casanova MD  [facility]  [address]  [phone]               Medication List      New Prescriptions    carbamide peroxide 6.5 % otic solution  Commonly known as: DEBROX  Administer 5 drops to the right ear 2 (Two) Times a Day for 4 days.           Where to Get Your Medications      These medications were sent to [id number] - [place], [place] - [address] AT [facility] - [phone]  - [phone] FX [address] , [facility] [address]    Hours: 24-hours Phone: [phone]   · carbamide peroxide 6.5 % otic solution                  Clay Land II, MD  06/09/21 2039

## 2021-10-14 ENCOUNTER — HOSPITAL ENCOUNTER (OUTPATIENT)
Dept: PSYCHIATRY | Facility: HOSPITAL | Age: 61
Discharge: HOME | End: 2021-10-14
Admitting: SOCIAL WORKER

## 2021-10-14 PROCEDURE — 90791 PSYCH DIAGNOSTIC EVALUATION: CPT | Performed by: SOCIAL WORKER

## 2021-10-14 NOTE — CONSULTS
"[facility] evaluation for IOP:  Patient presents with increasing Depression and Anxiety.  He sees Dr. Rosemary Michael for med management and she recommended that patient return to [facility].  He was here in 2019.  He has an appointment to see Dr. Michael on 10/28/21.  He is diagnosed with Major Depressive Disorder and Anxiety Disorder.  He is not currently seeing a therapist due to financial issues.  He was not sure of all the meds he is taking so was asked to bring them in when he starts the program.  He did mention taking Adderall and Xanax.  He has never done anything to harm himself but has had thoughts of suicide which he would not act upon because he is a [employer]ec[employer] and also \"a chicken\".  Patient has seen his PCP (Dr. Geronimo) within the year and is being treated for diabetes, has had two heart attacks, and at one point was diagnosed with Stage 4 liver cirrhosis but that resolved when he stopped taking statin drugs.  His father  of lung cancer at 63 (both parents were heavy smokers) and his mother of a heart attack at 55.  Patient was in law school at the time and says he never recovered from their deaths.  Patient's mother attempted suicide after the death of her firstborn when the child was 6 and never recovered emotionally from that loss.  Patient traces the majority of his problems to the loss of his parents.  He has an older sister that he has little contact with.  He has attempted to maintain a relationship with his niece who has two young boys but she has not responded to his calls requesting visits.  Patient went on to say that this is because he is very conservative and they disagree on most things.  He is very lonely as he has few friends.  Patient never  due to his fear that it would not work out and very much regrets this decision.  He has been more isolated in the past two years due to COVID and often has trouble leaving the house.  He has missed a good deal of work.  Patient " is an  and works for the [employer].  He does not like this job but sees positive opportunities within the year.  Patient rates his depression and anxiety at an 8 out of 10, says he has no energy and poor sleep. Patient denies any drug or alcohol use Patient requests a modification of the IOP schedule as he has to work-maybe coming 3 days a week.  He has exhausted his FMLA and has already taken a leave of absence.  Patient will begin program Monday 10/18/21.

## 2021-10-18 ENCOUNTER — OFFICE VISIT (OUTPATIENT)
Dept: PSYCHIATRY | Facility: HOSPITAL | Age: 61
End: 2021-10-18

## 2021-10-18 DIAGNOSIS — F33.1 MAJOR DEPRESSIVE DISORDER, RECURRENT EPISODE, MODERATE (HCC): Primary | ICD-10-CM

## 2021-10-18 PROCEDURE — S9480 INTENSIVE OUTPATIENT PSYCHIA: HCPCS | Performed by: COUNSELOR

## 2021-10-18 NOTE — TREATMENT PLAN
Multi-Disciplinary Problems (from Behavioral Health Treatment Plan)    Active Problems     Problem: Anxiety  Start Date: 10/18/21    Problem Details: The patient self-scales this problem as a 8 with 10 being the worst.        Goal Priority Start Date Expected End Date End Date    Patient will develop and implement behavioral and cognitive strategies to reduce anxiety and irrational fears. -- 10/18/21 -- --    Goal Details: Progress toward goal:  Not appropriate to rate progress toward goal since this is the initial treatment plan.        Goal Intervention Frequency Start Date End Date    Help patient explore past emotional issues in relation to present anxiety. Weekly 10/18/21 --    Intervention Details: Duration of treatment until until discharged.        Goal Intervention Frequency Start Date End Date    Help patient develop an awareness of their cognitive and physical responses to anxiety. Weekly 10/18/21 --    Intervention Details: Duration of treatment until until discharged.              Problem: Depression  Start Date: 10/18/21    Problem Details: The patient self-scales this problem as a 8 with 10 being the worst.        Goal Priority Start Date Expected End Date End Date    Patient will demonstrate the ability to initiate new constructive life skills outside of sessions on a consistent basis. -- 10/18/21 -- --    Goal Details: Progress toward goal:  Not appropriate to rate progress toward goal since this is the initial treatment plan.        Goal Intervention Frequency Start Date End Date    Assist patient in setting attainable activities of daily living goals. PRN 10/18/21 --    Goal Intervention Frequency Start Date End Date    Provide education about depression Weekly 10/18/21 --    Intervention Details: Duration of treatment until until discharged.        Goal Intervention Frequency Start Date End Date    Assist patient in developing healthy coping strategies. Weekly 10/18/21 --    Intervention Details:  Duration of treatment until until discharged.                    Reviewed By     Beverly Valdez, [facility] 10/18/21 0953                 I have discussed and reviewed this treatment plan with the patient.  It has been printed for signatures.

## 2021-10-18 NOTE — PROGRESS NOTES
Mood at 8  with 10 being the worse and found being here and having someone say there is hope was most helpful today.    Feeling sad or empty   Fatigue or loss of energy  Loss of interest in things you usually like to do  Easily tearing up/crying  Changes in normal sleep patterns (increase, decrease, or broken hoours of sleep)  Feelings of worthlessness  Feelings of increased guilt  Increased nervous feelings/anxiety     No SI    Plans to go to work today after IOP    Due to the need to work, pt is planning to attend IOP on M,W and Friday this week.

## 2021-10-18 NOTE — PROGRESS NOTES
Psych IOP  Group note  Observations:    Engaged in Activity / Process and Self -disclosed: Yes  Applies Topic to self: Yes  Able to give Constructive Feedback: No  Affect: sad and tearful  Degree of Insightful Thinking 5  Notes:  New to the group today.  Shared of many physical issues over the years and long hx of depression.  Described feeling empty and no reason to live but he is a Yazidi and would not take his own life.  Feels very alone--pt was given support group info and encouraged to call.  Does not have a therapist d/t financial struggles since he has missed so much work.    He remained quiet but attentive as others shared today.      Beverly Valdez, [facility]

## 2021-10-18 NOTE — TREATMENT PLAN
Multi-Disciplinary Problems (from Behavioral Health Treatment Plan)    Active Problems     Problem: Anxiety  Start Date: 10/18/21    Problem Details: The patient self-scales this problem as a 8 with 10 being the worst.        Goal Priority Start Date Expected End Date End Date    Patient will develop and implement behavioral and cognitive strategies to reduce anxiety and irrational fears. -- 10/18/21 -- --    Goal Details: Progress toward goal:  Not appropriate to rate progress toward goal since this is the initial treatment plan.        Goal Intervention Frequency Start Date End Date    Help patient explore past emotional issues in relation to present anxiety. Weekly 10/18/21 --    Intervention Details: Duration of treatment until until discharged.        Goal Intervention Frequency Start Date End Date    Help patient develop an awareness of their cognitive and physical responses to anxiety. Weekly 10/18/21 --    Intervention Details: Duration of treatment until until discharged.              Problem: Depression  Start Date: 10/18/21    Problem Details: The patient self-scales this problem as a 8 with 10 being the worst.        Goal Priority Start Date Expected End Date End Date    Patient will demonstrate the ability to initiate new constructive life skills outside of sessions on a consistent basis. -- 10/18/21 -- --    Goal Details: Progress toward goal:  Not appropriate to rate progress toward goal since this is the initial treatment plan.        Goal Intervention Frequency Start Date End Date    Assist patient in setting attainable activities of daily living goals. PRN 10/18/21 --    Goal Intervention Frequency Start Date End Date    Provide education about depression Weekly 10/18/21 --    Intervention Details: Duration of treatment until until discharged.        Goal Intervention Frequency Start Date End Date    Assist patient in developing healthy coping strategies. Weekly 10/18/21 --    Intervention Details:  Duration of treatment until until discharged.                    Reviewed By     Beverly Valdez, [facility] 10/18/21 0953                 I have discussed and reviewed this treatment plan with the patient.  It has been printed for signatures.

## 2021-10-18 NOTE — PROGRESS NOTES
Other     Information/activity     [id number] Showed videos of Self-forgiveness, Empathy and Black Dog and had discussion of having compassion for self    Instructor Domenic Bowie, LPAT     10:28 EDT     Patient Response Good participation  Pt shared struggles with self-forgiveness and voiced desire to change the past

## 2021-10-22 ENCOUNTER — OFFICE VISIT (OUTPATIENT)
Dept: PSYCHIATRY | Facility: HOSPITAL | Age: 61
End: 2021-10-22

## 2021-10-22 DIAGNOSIS — F33.1 MAJOR DEPRESSIVE DISORDER, RECURRENT EPISODE, MODERATE (HCC): Primary | ICD-10-CM

## 2021-10-22 PROCEDURE — S9480 INTENSIVE OUTPATIENT PSYCHIA: HCPCS | Performed by: SOCIAL WORKER

## 2021-10-22 NOTE — PROGRESS NOTES
"[id number] Psych IOP Class-    Class topic: self-love meditation part two \"Gratitude\"    Class participation: good; pt actively engaged and listening.     "

## 2021-10-22 NOTE — PROGRESS NOTES
Psych IOP  Group note  Observations:    Engaged in Activity / Process and Self -disclosed: Yes  Applies Topic to self: Yes  Able to give Constructive Feedback: Yes  Affect: tearful  Degree of Insightful Thinking 5  Notes:  Pt engaged in group and shared about days he has been away from IOP. Pt processed loneliness he feels being isolated at home but difficulty in leaving the house. Pt was tearful and sad in talking about loss of being able to spend time with his nephews and how hurt he has been by his niece in this decision. Pt expressed not having a lot to live for but also stated that he was not suicidal and would not kill himself. Pt receptive to support from group and feedback about talking to his niece about being in treatment and seeing if there was a way to see his nephews while respecting niece's boundaries. Pt also actively listened to what other group members shared and offered support.       Gretchen Webb LCSW

## 2021-10-22 NOTE — PROGRESS NOTES
Mood at 6 with 10 being the worse and found feeling that the group members care about him the most helpful.  Symptoms include:    Feeling sad or empty   Fatigue or loss of energy  Loss of interest in things you usually like to do  Changes in normal sleep patterns (increase, decrease, or broken hoours of sleep)  Feelings of worthlessness  Feelings of increased guilt  Increased nervous feelings/anxiety     No SI.  Appropriate goals.  Met with patient as he has been missing program and not going to work as originally planned.  He is not sleeping and often feels that he cannot leave the house.  Consent to speak with his psychiatrist, Dr. Rosemary Michael.  He has an appointment with her on 10/26.  Patient states his depression has never lasted this long.  He also talked about getting off Xanax as he does not think it is helping but is aware he needs to do that under his doctor's supervision.  He is upset about his estrangement from family.  Suggested he write a letter to his niece and we could discuss on Monday.

## 2021-10-25 ENCOUNTER — OFFICE VISIT (OUTPATIENT)
Dept: PSYCHIATRY | Facility: HOSPITAL | Age: 61
End: 2021-10-25

## 2021-10-25 DIAGNOSIS — F33.1 MAJOR DEPRESSIVE DISORDER, RECURRENT EPISODE, MODERATE (HCC): Primary | ICD-10-CM

## 2021-10-25 PROCEDURE — S9480 INTENSIVE OUTPATIENT PSYCHIA: HCPCS | Performed by: ART THERAPIST

## 2021-10-25 NOTE — PROGRESS NOTES
Mental Health Awareness Class   ([id number])  Information/activity     Types and Stages of Grief    Instructor Yandy Marquis LCSW     10:26 EDT     Patient Response Good participation

## 2021-10-25 NOTE — PROGRESS NOTES
"1:1 processing time after wrap-up.    Pt became tearful and shared with this therapist that not able to function to return to work today. Pt shared this was because had not slept and now fears of getting fired. Pt then shared need to get something \" off my chest.\" Pt first shared of relationship 2 to 3 years ago which ended up with pt feeling used and rejected as pt gave of money, home and cared for her children. She returned to using drugs and then rejected pt from contact again. Pt was tearful again as admitted to addiction to porn and described how this goes against own Jehovah's witness. Pt was encouraged to focus on what can do today to make a connection. Pt then shared of need to rest and wants to go to dinner with friend. Pt also shared not able to function to go to work today. Pt was encouraged to communicate with work. Pt asked this therapist to sit with pt as pt called work. Pt sent text to boss and got a response to contact HR. Pt then sent message to HR about not being able to function today, plan to see psychiatrist tomorrow and hopes to get back to work at later time. Pt shared plan to return to IOP tomorrow.  "

## 2021-10-25 NOTE — PROGRESS NOTES
Psych IOP  Group note  Observations:    Engaged in Activity / Process and Self -disclosed: Yes  Applies Topic to self: Yes  Able to give Constructive Feedback: Yes  Affect: blunted, sad and tearful  Degree of Insightful Thinking 5  Notes:  Pt shared of happy memory as requested and related to peers about connection to children. Pt then shared tears of the memory as feeling will not be able to have that feeling again. Pt also shared dread of returning to work.       Domenic Bowie, LPAT

## 2021-10-25 NOTE — PROGRESS NOTES
"Mood at 9 with 10 being the worse and shared \"just being here\" was most helpful today.    Symptoms include:    Feeling sad or empty   Trouble with concentration, memory, or making decisions  Fatigue or loss of energy  Loss of interest in things you usually like to do  Easily tearing up/crying  Changes in normal sleep patterns (increase, decrease, or broken hoours of sleep)  Feelings of worthlessness  Feelings of increased guilt  Increased nervous feelings/anxiety     Denied SI and had difficulty coming up with goals. Pt then shared of resting and going out with friend for dinner.      "

## 2021-10-26 ENCOUNTER — TELEPHONE (OUTPATIENT)
Dept: PSYCHIATRY | Facility: HOSPITAL | Age: 61
End: 2021-10-26

## 2021-10-26 NOTE — TELEPHONE ENCOUNTER
Spoke with Dr. Rosemary Michael about patient as he has an appointment with her this afternoon.  Shared with her our concerns  about his depression and impaired functioning.  Discussed a higher level of care if patient's symptoms do not improve.

## 2021-10-27 ENCOUNTER — APPOINTMENT (OUTPATIENT)
Dept: PSYCHIATRY | Facility: HOSPITAL | Age: 61
End: 2021-10-27

## 2021-10-27 ENCOUNTER — DOCUMENTATION (OUTPATIENT)
Dept: PSYCHIATRY | Facility: HOSPITAL | Age: 61
End: 2021-10-27

## 2021-10-27 NOTE — PLAN OF CARE
Pt has attended 3 days of IOP since his evaluation on 10/14/21. Pt presented with symptoms of depression and struggles to function at work, socially, and with family. Pt notes all of this, in addition to grief, as reasons for seeking treatment again. Pt is established with Dr. Michael for medication management who has consulted about his treatment. Pt reports severe depression and has stated he has no reason to live but has said he is not suicidal and has no plan or intent to kill himself. Pt has been talked to about potential of residential treatment if symptoms continue to worsen and therapist will revisit this discussion when pt returns to IOP. Pt has missed several days of IOP due to report of lack of sleep and is absent again today. Attendance expectations to also be discussed if pt remains in treatment.        Patient/Guardian Signature: __________________________________            Psychiatrist Signature: ______________________________________             Therapist Signature: ________________________________________         Nurse Signature: ___________________________________________          Staff Signature: ____________________________________________            Staff Signature: ____________________________________________          Staff Signature: ____________________________________________          Staff Signature:

## 2021-10-28 ENCOUNTER — APPOINTMENT (OUTPATIENT)
Dept: PSYCHIATRY | Facility: HOSPITAL | Age: 61
End: 2021-10-28

## 2021-10-29 ENCOUNTER — DOCUMENTATION (OUTPATIENT)
Dept: PSYCHIATRY | Facility: HOSPITAL | Age: 61
End: 2021-10-29

## 2021-10-29 ENCOUNTER — APPOINTMENT (OUTPATIENT)
Dept: PSYCHIATRY | Facility: HOSPITAL | Age: 61
End: 2021-10-29

## 2021-10-29 ENCOUNTER — TELEPHONE (OUTPATIENT)
Dept: PSYCHIATRY | Facility: HOSPITAL | Age: 61
End: 2021-10-29

## 2021-11-01 ENCOUNTER — APPOINTMENT (OUTPATIENT)
Dept: PSYCHIATRY | Facility: HOSPITAL | Age: 61
End: 2021-11-01

## 2021-11-02 ENCOUNTER — APPOINTMENT (OUTPATIENT)
Dept: PSYCHIATRY | Facility: HOSPITAL | Age: 61
End: 2021-11-02

## 2021-11-03 ENCOUNTER — APPOINTMENT (OUTPATIENT)
Dept: PSYCHIATRY | Facility: HOSPITAL | Age: 61
End: 2021-11-03

## 2021-11-04 ENCOUNTER — APPOINTMENT (OUTPATIENT)
Dept: PSYCHIATRY | Facility: HOSPITAL | Age: 61
End: 2021-11-04

## 2021-11-05 ENCOUNTER — APPOINTMENT (OUTPATIENT)
Dept: PSYCHIATRY | Facility: HOSPITAL | Age: 61
End: 2021-11-05

## 2021-11-08 ENCOUNTER — APPOINTMENT (OUTPATIENT)
Dept: PSYCHIATRY | Facility: HOSPITAL | Age: 61
End: 2021-11-08

## 2021-11-09 ENCOUNTER — APPOINTMENT (OUTPATIENT)
Dept: PSYCHIATRY | Facility: HOSPITAL | Age: 61
End: 2021-11-09

## 2021-11-10 ENCOUNTER — APPOINTMENT (OUTPATIENT)
Dept: PSYCHIATRY | Facility: HOSPITAL | Age: 61
End: 2021-11-10

## 2021-11-11 ENCOUNTER — APPOINTMENT (OUTPATIENT)
Dept: PSYCHIATRY | Facility: HOSPITAL | Age: 61
End: 2021-11-11

## 2021-11-12 ENCOUNTER — APPOINTMENT (OUTPATIENT)
Dept: PSYCHIATRY | Facility: HOSPITAL | Age: 61
End: 2021-11-12

## 2022-01-24 ENCOUNTER — APPOINTMENT (OUTPATIENT)
Dept: GENERAL RADIOLOGY | Facility: HOSPITAL | Age: 62
End: 2022-01-24

## 2022-01-24 ENCOUNTER — HOSPITAL ENCOUNTER (EMERGENCY)
Facility: HOSPITAL | Age: 62
Discharge: HOME | End: 2022-01-24
Attending: EMERGENCY MEDICINE | Admitting: EMERGENCY MEDICINE

## 2022-01-24 VITALS
HEIGHT: 69 IN | DIASTOLIC BLOOD PRESSURE: 76 MMHG | TEMPERATURE: 98 F | OXYGEN SATURATION: 98 % | WEIGHT: 180 LBS | RESPIRATION RATE: 20 BRPM | HEART RATE: 87 BPM | SYSTOLIC BLOOD PRESSURE: 110 MMHG | BODY MASS INDEX: 26.66 KG/M2

## 2022-01-24 DIAGNOSIS — F41.0 PANIC ATTACK: Primary | ICD-10-CM

## 2022-01-24 DIAGNOSIS — Z86.59 HISTORY OF ANXIETY: ICD-10-CM

## 2022-01-24 LAB
ALBUMIN SERPL-MCNC: 4 G/DL (ref 3.5–5.2)
ALBUMIN/GLOB SERPL: 1.7 G/DL
ALP SERPL-CCNC: 83 U/L (ref 39–117)
ALT SERPL W P-5'-P-CCNC: 62 U/L (ref 1–41)
ANION GAP SERPL CALCULATED.3IONS-SCNC: 10.8 MMOL/L (ref 5–15)
AST SERPL-CCNC: 57 U/L (ref 1–40)
BACTERIA UR QL AUTO: ABNORMAL /HPF
BASOPHILS # BLD AUTO: 0 10*3/MM3 (ref 0–0.2)
BASOPHILS NFR BLD AUTO: 0 % (ref 0–1.5)
BILIRUB SERPL-MCNC: 0.5 MG/DL (ref 0–1.2)
BILIRUB UR QL STRIP: NEGATIVE
BUN SERPL-MCNC: 11 MG/DL (ref 8–23)
BUN/CREAT SERPL: 11.3 (ref 7–25)
CALCIUM SPEC-SCNC: 8.3 MG/DL (ref 8.6–10.5)
CHLORIDE SERPL-SCNC: 99 MMOL/L (ref 98–107)
CLARITY UR: CLEAR
CO2 SERPL-SCNC: 23.2 MMOL/L (ref 22–29)
COLOR UR: ABNORMAL
CREAT SERPL-MCNC: 0.97 MG/DL (ref 0.76–1.27)
DEPRECATED RDW RBC AUTO: 39 FL (ref 37–54)
EOSINOPHIL # BLD AUTO: 0 10*3/MM3 (ref 0–0.4)
EOSINOPHIL NFR BLD AUTO: 0 % (ref 0.3–6.2)
ERYTHROCYTE [DISTWIDTH] IN BLOOD BY AUTOMATED COUNT: 12.5 % (ref 12.3–15.4)
GFR SERPL CREATININE-BSD FRML MDRD: 79 ML/MIN/1.73
GLOBULIN UR ELPH-MCNC: 2.3 GM/DL
GLUCOSE SERPL-MCNC: 121 MG/DL (ref 65–99)
GLUCOSE UR STRIP-MCNC: NEGATIVE MG/DL
HCT VFR BLD AUTO: 39.6 % (ref 37.5–51)
HGB BLD-MCNC: 13.6 G/DL (ref 13–17.7)
HGB UR QL STRIP.AUTO: NEGATIVE
HYALINE CASTS UR QL AUTO: ABNORMAL /LPF
IMM GRANULOCYTES # BLD AUTO: 0.02 10*3/MM3 (ref 0–0.05)
IMM GRANULOCYTES NFR BLD AUTO: 0.3 % (ref 0–0.5)
KETONES UR QL STRIP: NEGATIVE
LEUKOCYTE ESTERASE UR QL STRIP.AUTO: ABNORMAL
LYMPHOCYTES # BLD AUTO: 1.81 10*3/MM3 (ref 0.7–3.1)
LYMPHOCYTES NFR BLD AUTO: 30 % (ref 19.6–45.3)
MAGNESIUM SERPL-MCNC: 1.9 MG/DL (ref 1.6–2.4)
MCH RBC QN AUTO: 29.6 PG (ref 26.6–33)
MCHC RBC AUTO-ENTMCNC: 34.3 G/DL (ref 31.5–35.7)
MCV RBC AUTO: 86.3 FL (ref 79–97)
MONOCYTES # BLD AUTO: 0.46 10*3/MM3 (ref 0.1–0.9)
MONOCYTES NFR BLD AUTO: 7.6 % (ref 5–12)
NEUTROPHILS NFR BLD AUTO: 3.75 10*3/MM3 (ref 1.7–7)
NEUTROPHILS NFR BLD AUTO: 62.1 % (ref 42.7–76)
NITRITE UR QL STRIP: NEGATIVE
NRBC BLD AUTO-RTO: 0 /100 WBC (ref 0–0.2)
PH UR STRIP.AUTO: <=5 [PH] (ref 5–8)
PLATELET # BLD AUTO: 190 10*3/MM3 (ref 140–450)
PMV BLD AUTO: 10.4 FL (ref 6–12)
POTASSIUM SERPL-SCNC: 3.9 MMOL/L (ref 3.5–5.2)
PROT SERPL-MCNC: 6.3 G/DL (ref 6–8.5)
PROT UR QL STRIP: NEGATIVE
RBC # BLD AUTO: 4.59 10*6/MM3 (ref 4.14–5.8)
RBC # UR STRIP: ABNORMAL /HPF
REF LAB TEST METHOD: ABNORMAL
SODIUM SERPL-SCNC: 133 MMOL/L (ref 136–145)
SP GR UR STRIP: 1.03 (ref 1–1.03)
SQUAMOUS #/AREA URNS HPF: ABNORMAL /HPF
UROBILINOGEN UR QL STRIP: ABNORMAL
WBC # UR STRIP: ABNORMAL /HPF
WBC NRBC COR # BLD: 6.04 10*3/MM3 (ref 3.4–10.8)

## 2022-01-24 PROCEDURE — 25010000002 LORAZEPAM PER 2 MG: Performed by: EMERGENCY MEDICINE

## 2022-01-24 PROCEDURE — 85025 COMPLETE CBC W/AUTO DIFF WBC: CPT | Performed by: EMERGENCY MEDICINE

## 2022-01-24 PROCEDURE — 81001 URINALYSIS AUTO W/SCOPE: CPT | Performed by: EMERGENCY MEDICINE

## 2022-01-24 PROCEDURE — 74018 RADEX ABDOMEN 1 VIEW: CPT

## 2022-01-24 PROCEDURE — 80053 COMPREHEN METABOLIC PANEL: CPT | Performed by: EMERGENCY MEDICINE

## 2022-01-24 PROCEDURE — 99283 EMERGENCY DEPT VISIT LOW MDM: CPT

## 2022-01-24 PROCEDURE — 83735 ASSAY OF MAGNESIUM: CPT | Performed by: EMERGENCY MEDICINE

## 2022-01-24 PROCEDURE — 96374 THER/PROPH/DIAG INJ IV PUSH: CPT

## 2022-01-24 RX ORDER — LORAZEPAM 2 MG/ML
1 INJECTION INTRAMUSCULAR ONCE
Status: COMPLETED | OUTPATIENT
Start: 2022-01-24 | End: 2022-01-24

## 2022-01-24 RX ORDER — SIMETHICONE 80 MG
80 TABLET,CHEWABLE ORAL EVERY 6 HOURS PRN
Qty: 30 TABLET | Refills: 0 | Status: SHIPPED | OUTPATIENT
Start: 2022-01-24

## 2022-01-24 RX ORDER — HYDROXYZINE HYDROCHLORIDE 25 MG/1
25 TABLET, FILM COATED ORAL EVERY 6 HOURS PRN
Qty: 30 TABLET | Refills: 0 | Status: SHIPPED | OUTPATIENT
Start: 2022-01-24

## 2022-01-24 RX ORDER — LEMBOREXANT 10 MG/1
TABLET, FILM COATED ORAL NIGHTLY
COMMUNITY

## 2022-01-24 RX ORDER — DESVENLAFAXINE 100 MG/1
100 TABLET, EXTENDED RELEASE ORAL DAILY
COMMUNITY

## 2022-01-24 RX ADMIN — LORAZEPAM 1 MG: 2 INJECTION INTRAMUSCULAR; INTRAVENOUS at 01:34

## 2022-01-24 NOTE — ED NOTES
"Pt from home via EMS. Pt woke up around 6-7pm this evening with lower back pain and \"trembling in the arms\". EMS states he was experiencing tachypnea when they arrived on scene. Pt reported history of panic attack and anxiety, he was prescribed \"amphetamine salts for that and took one upon waking up this evening.\"  Pt also reports that he has been depressed and laying on the couch for weeks. Pt ambulatory w/ assistance on scene.      Anne Marie More, CARLEE  01/24/22 0054       Anne Marie More RN  01/24/22 0057    "

## 2022-01-24 NOTE — ED PROVIDER NOTES
" EMERGENCY DEPARTMENT ENCOUNTER    Room Number:  03/03  Date of encounter:  1/24/2022  PCP: Provider, No Known  Historian: Patient      HPI:  Chief Complaint: Anxiety       Context: aJck Lopez is a 61 y.o. male with past medical history of CAD/MI s/p CABG, CHF, HTN, HLD, T2DM, and BPH who presents to the ED c/o anxiety.  Patient states that for the past couple of days he has felt very shaky, jumpy, and having the urge to defecate even though he knows he does not have to and all that comes out his gas.  Patient states that he took an Adderall earlier for his anxiety because that usually \"snaps and right out of it\" and makes him feel better.  Patient denies any low back pain, loss of bladder or bowel control, urinary retention, fever, chills, dysuria, hematuria, chest pain or shortness of breath.      PAST MEDICAL HISTORY  Active Ambulatory Problems     Diagnosis Date Noted   • No Active Ambulatory Problems     Resolved Ambulatory Problems     Diagnosis Date Noted   • No Resolved Ambulatory Problems     Past Medical History:   Diagnosis Date   • Anxiety    • Asthma    • CAD (coronary artery disease)    • Chronic systolic CHF (congestive heart failure) ([facility])    • Depression    • Diabetes mellitus ([facility])    • Diastolic dysfunction    • Hyperlipidemia    • Hypertension    • Ischemic cardiomyopathy    • Mitral valve regurgitation    • Myocardial infarction ([facility])    • NSTEMI (non-ST elevated myocardial infarction) ([facility])    • Prostatic hypertrophy    • Seasonal allergies          PAST SURGICAL HISTORY  Past Surgical History:   Procedure Laterality Date   • CARDIAC SURGERY      CABG 4 vessel 2010         FAMILY HISTORY  Family History   Problem Relation Age of Onset   • No Known Problems Mother    • No Known Problems Father    • No Known Problems Sister          SOCIAL HISTORY  Social History     Socioeconomic History   • Marital status: Single   Tobacco Use   • Smoking status: Never Smoker   Substance and Sexual " Activity   • Alcohol use: No   • Drug use: No         ALLERGIES  Metformin        REVIEW OF SYSTEMS  Review of Systems     All systems reviewed and negative except for those discussed in HPI.       PHYSICAL EXAM    I have reviewed the triage vital signs and nursing notes.    ED Triage Vitals [01/24/22 0055]   Temp Heart Rate Resp BP SpO2   98 °F (36.7 °C) 78 20 130/69 98 %      Temp src Heart Rate Source Patient Position BP Location FiO2 (%)   -- -- -- -- --       Physical Exam  GENERAL: Alert and oriented x3, anxious   HENT: nares patent  EYES: no scleral icterus  CV: regular rhythm, regular rate  RESPIRATORY: normal effort  ABDOMEN: soft/nontender, nondistended, no rebound or guarding  MUSCULOSKELETAL: no deformity  NEURO: alert, moves all extremities, no focal neurodeficits, follows commands  SKIN: warm, dry, no rashes        LAB RESULTS  Recent Results (from the past 24 hour(s))   Comprehensive Metabolic Panel    Collection Time: 01/24/22  1:29 AM    Specimen: Blood   Result Value Ref Range    Glucose 121 (H) 65 - 99 mg/dL    BUN 11 8 - 23 mg/dL    Creatinine 0.97 0.76 - 1.27 mg/dL    Sodium 133 (L) 136 - 145 mmol/L    Potassium 3.9 3.5 - 5.2 mmol/L    Chloride 99 98 - 107 mmol/L    CO2 23.2 22.0 - 29.0 mmol/L    Calcium 8.3 (L) 8.6 - 10.5 mg/dL    Total Protein 6.3 6.0 - 8.5 g/dL    Albumin 4.00 3.50 - 5.20 g/dL    ALT (SGPT) 62 (H) 1 - 41 U/L    AST (SGOT) 57 (H) 1 - 40 U/L    Alkaline Phosphatase 83 39 - 117 U/L    Total Bilirubin 0.5 0.0 - 1.2 mg/dL    eGFR Non African Amer 79 >60 mL/min/1.73    Globulin 2.3 gm/dL    A/G Ratio 1.7 g/dL    BUN/Creatinine Ratio 11.3 7.0 - 25.0    Anion Gap 10.8 5.0 - 15.0 mmol/L   Magnesium    Collection Time: 01/24/22  1:29 AM    Specimen: Blood   Result Value Ref Range    Magnesium 1.9 1.6 - 2.4 mg/dL   CBC Auto Differential    Collection Time: 01/24/22  1:29 AM    Specimen: Blood   Result Value Ref Range    WBC 6.04 3.40 - 10.80 10*3/mm3    RBC 4.59 4.14 - 5.80 10*6/mm3     Hemoglobin 13.6 13.0 - 17.7 g/dL    Hematocrit 39.6 37.5 - 51.0 %    MCV 86.3 79.0 - 97.0 fL    MCH 29.6 26.6 - 33.0 pg    MCHC 34.3 31.5 - 35.7 g/dL    RDW 12.5 12.3 - 15.4 %    RDW-SD 39.0 37.0 - 54.0 fl    MPV 10.4 6.0 - 12.0 fL    Platelets 190 140 - 450 10*3/mm3    Neutrophil % 62.1 42.7 - 76.0 %    Lymphocyte % 30.0 19.6 - 45.3 %    Monocyte % 7.6 5.0 - 12.0 %    Eosinophil % 0.0 (L) 0.3 - 6.2 %    Basophil % 0.0 0.0 - 1.5 %    Immature Grans % 0.3 0.0 - 0.5 %    Neutrophils, Absolute 3.75 1.70 - 7.00 10*3/mm3    Lymphocytes, Absolute 1.81 0.70 - 3.10 10*3/mm3    Monocytes, Absolute 0.46 0.10 - 0.90 10*3/mm3    Eosinophils, Absolute 0.00 0.00 - 0.40 10*3/mm3    Basophils, Absolute 0.00 0.00 - 0.20 10*3/mm3    Immature Grans, Absolute 0.02 0.00 - 0.05 10*3/mm3    nRBC 0.0 0.0 - 0.2 /100 WBC   Urinalysis With Microscopic If Indicated (No Culture) - Urine, Clean Catch    Collection Time: 01/24/22  3:01 AM    Specimen: Urine, Clean Catch   Result Value Ref Range    Color, UA Dark Yellow (A) Yellow, Straw    Appearance, UA Clear Clear    pH, UA <=5.0 5.0 - 8.0    Specific Gravity, UA 1.027 1.005 - 1.030    Glucose, UA Negative Negative    Ketones, UA Negative Negative    Bilirubin, UA Negative Negative    Blood, UA Negative Negative    Protein, UA Negative Negative    Leuk Esterase, UA Small (1+) (A) Negative    Nitrite, UA Negative Negative    Urobilinogen, UA 1.0 E.U./dL 0.2 - 1.0 E.U./dL   Urinalysis, Microscopic Only - Urine, Clean Catch    Collection Time: 01/24/22  3:01 AM    Specimen: Urine, Clean Catch   Result Value Ref Range    RBC, UA 0-2 None Seen, 0-2 /HPF    WBC, UA 6-12 (A) None Seen, 0-2 /HPF    Bacteria, UA None Seen None Seen /HPF    Squamous Epithelial Cells, UA 0-2 None Seen, 0-2 /HPF    Hyaline Casts, UA 3-6 None Seen /LPF    Methodology Automated Microscopy        Ordered the above labs and independently reviewed the results.        RADIOLOGY  XR Abdomen KUB    Result Date: 1/24/2022  KUB   HISTORY: Patient  COMPARISON: None available.  FINDINGS: There is gaseous distention of colon, as well as some gaseous distention of small bowel loops within the left side of the abdomen. Appearance may reflect ileus. No free air is seen beneath the diaphragm.      Nonspecific gaseous distention of colon, as well as some small bowel loops within the left side the abdomen.  This report was finalized on 1/24/2022 2:01 AM by Dr. Modesta Bright M.D.        I ordered the above noted radiological studies. Reviewed by me and discussed with radiologist.  See dictation for official radiology interpretation.      PROCEDURES    Procedures      MEDICATIONS GIVEN IN ER    Medications   LORazepam (ATIVAN) injection 1 mg (1 mg Intravenous Given 1/24/22 0134)         PROGRESS, DATA ANALYSIS, CONSULTS, AND MEDICAL DECISION MAKING    All labs have been independently reviewed by me.  All radiology studies have been reviewed by me and discussed with radiologist dictating the report.   EKG's independently viewed and interpreted by me.  Discussion below represents my analysis of pertinent findings related to patient's condition, differential diagnosis, treatment plan and final disposition.    DDx: Includes but not limited to panic attack, anxiety disorder, constipation    ED Course as of 01/24/22 0427   Mon Jan 24, 2022   0215 Patient ambulating to the bathroom easily and without assistance. [GÓMEZ]   0405 Patient rechecked, improved, discussed results, diagnosis, and treatment plan.  Patient expressed understanding and agrees with plan. [GÓMEZ]      ED Course User Index  [GÓMEZ] Gustavo Sun PA       MDM: Patient's evaluation is unremarkable, there is no evidence for an acute or emergent process, and his symptoms are improved after IV anxiety medicine.  Patient will be discharged with hydroxyzine, patient has been seen through behavioral health and their [facility].  Vital signs are stable, patient is safe for discharge home.    PPE: The  patient wore a surgical mask throughout the entire patient encounter. I wore an N95.    AS OF 04:27 EST VITALS:    BP - 130/69  HR - 78  TEMP - 98 °F (36.7 °C)  O2 SATS - 98%        DIAGNOSIS  Final diagnoses:   Panic attack   History of anxiety         DISPOSITION  DISCHARGE    Patient discharged in stable condition.    Reviewed implications of results, diagnosis, meds, responsibility to follow up, warning signs and symptoms of possible worsening, potential complications and reasons to return to ER.    Patient/Family voiced understanding of above instructions.    Discussed plan for discharge, as there is no emergent indication for admission. Patient referred to primary care provider for BP management due to today's BP. Pt/family is agreeable and understands need for follow up and repeat testing.  Pt is aware that discharge does not mean that nothing is wrong but it indicates no emergency is present that requires admission and they must continue care with follow-up as given below or physician of their choice.     FOLLOW-UP  PATIENT CONNECTION - [place] [id number] [phone]  Schedule an appointment as soon as possible for a visit in 1 week           Medication List      New Prescriptions    hydrOXYzine 25 MG tablet  Commonly known as: ATARAX  Take 1 tablet by mouth Every 6 (Six) Hours As Needed for Anxiety.     simethicone 80 MG chewable tablet  Commonly known as: Gas-X  Chew 1 tablet Every 6 (Six) Hours As Needed for Flatulence.           Where to Get Your Medications      These medications were sent to [id number] - [place] - [address] AT [facility] [place] [place] [address] - [phone]  - [phone] FX  [address] [place] [address]    Phone: [phone]   · hydrOXYzine 25 MG tablet  · simethicone 80 MG chewable tablet                    Gustavo Sun PA  01/24/22 0412       Gustavo Sun PA  01/24/22 042

## 2022-01-25 ENCOUNTER — HOSPITAL ENCOUNTER (OUTPATIENT)
Facility: HOSPITAL | Age: 62
Setting detail: OBSERVATION
Discharge: HOME | End: 2022-01-26
Attending: EMERGENCY MEDICINE | Admitting: EMERGENCY MEDICINE

## 2022-01-25 ENCOUNTER — APPOINTMENT (OUTPATIENT)
Dept: CT IMAGING | Facility: HOSPITAL | Age: 62
End: 2022-01-25

## 2022-01-25 DIAGNOSIS — G45.9 TIA (TRANSIENT ISCHEMIC ATTACK): Primary | ICD-10-CM

## 2022-01-25 PROCEDURE — 93005 ELECTROCARDIOGRAM TRACING: CPT | Performed by: EMERGENCY MEDICINE

## 2022-01-25 PROCEDURE — 70450 CT HEAD/BRAIN W/O DYE: CPT

## 2022-01-25 PROCEDURE — 93010 ELECTROCARDIOGRAM REPORT: CPT | Performed by: INTERNAL MEDICINE

## 2022-01-25 PROCEDURE — 99284 EMERGENCY DEPT VISIT MOD MDM: CPT

## 2022-01-25 RX ORDER — SODIUM CHLORIDE 0.9 % (FLUSH) 0.9 %
10 SYRINGE (ML) INJECTION AS NEEDED
Status: DISCONTINUED | OUTPATIENT
Start: 2022-01-25 | End: 2022-01-26 | Stop reason: HOSPADM

## 2022-01-26 VITALS
HEART RATE: 71 BPM | TEMPERATURE: 97.7 F | BODY MASS INDEX: 26.66 KG/M2 | DIASTOLIC BLOOD PRESSURE: 64 MMHG | OXYGEN SATURATION: 97 % | WEIGHT: 180 LBS | RESPIRATION RATE: 18 BRPM | SYSTOLIC BLOOD PRESSURE: 99 MMHG | HEIGHT: 69 IN

## 2022-01-26 PROBLEM — E87.6 HYPOKALEMIA: Status: ACTIVE | Noted: 2022-01-26

## 2022-01-26 PROBLEM — G45.9 TIA (TRANSIENT ISCHEMIC ATTACK): Status: RESOLVED | Noted: 2022-01-26 | Resolved: 2022-01-26

## 2022-01-26 PROBLEM — R20.2 PARESTHESIA: Status: ACTIVE | Noted: 2022-01-26

## 2022-01-26 PROBLEM — G45.9 TIA (TRANSIENT ISCHEMIC ATTACK): Status: ACTIVE | Noted: 2022-01-26

## 2022-01-26 LAB
ALBUMIN SERPL-MCNC: 3.5 G/DL (ref 3.5–5.2)
ALBUMIN/GLOB SERPL: 1.6 G/DL
ALP SERPL-CCNC: 62 U/L (ref 39–117)
ALT SERPL W P-5'-P-CCNC: 44 U/L (ref 1–41)
ANION GAP SERPL CALCULATED.3IONS-SCNC: 8.7 MMOL/L (ref 5–15)
APTT PPP: 29.7 SECONDS (ref 22.7–35.4)
AST SERPL-CCNC: 43 U/L (ref 1–40)
BACTERIA UR QL AUTO: NORMAL /HPF
BASOPHILS # BLD AUTO: 0 10*3/MM3 (ref 0–0.2)
BASOPHILS NFR BLD AUTO: 0 % (ref 0–1.5)
BILIRUB SERPL-MCNC: 0.4 MG/DL (ref 0–1.2)
BILIRUB UR QL STRIP: NEGATIVE
BUN SERPL-MCNC: 7 MG/DL (ref 8–23)
BUN/CREAT SERPL: 8.3 (ref 7–25)
CALCIUM SPEC-SCNC: 7.3 MG/DL (ref 8.6–10.5)
CHLORIDE SERPL-SCNC: 109 MMOL/L (ref 98–107)
CLARITY UR: CLEAR
CO2 SERPL-SCNC: 22.3 MMOL/L (ref 22–29)
COLOR UR: YELLOW
CREAT SERPL-MCNC: 0.84 MG/DL (ref 0.76–1.27)
DEPRECATED RDW RBC AUTO: 38.9 FL (ref 37–54)
DEPRECATED RDW RBC AUTO: 39.7 FL (ref 37–54)
EOSINOPHIL # BLD AUTO: 0 10*3/MM3 (ref 0–0.4)
EOSINOPHIL NFR BLD AUTO: 0 % (ref 0.3–6.2)
ERYTHROCYTE [DISTWIDTH] IN BLOOD BY AUTOMATED COUNT: 12.5 % (ref 12.3–15.4)
ERYTHROCYTE [DISTWIDTH] IN BLOOD BY AUTOMATED COUNT: 12.8 % (ref 12.3–15.4)
GFR SERPL CREATININE-BSD FRML MDRD: 93 ML/MIN/1.73
GLOBULIN UR ELPH-MCNC: 2.2 GM/DL
GLUCOSE SERPL-MCNC: 115 MG/DL (ref 65–99)
GLUCOSE UR STRIP-MCNC: NEGATIVE MG/DL
HCT VFR BLD AUTO: 38.3 % (ref 37.5–51)
HCT VFR BLD AUTO: 39.1 % (ref 37.5–51)
HGB BLD-MCNC: 13.1 G/DL (ref 13–17.7)
HGB BLD-MCNC: 13.3 G/DL (ref 13–17.7)
HGB UR QL STRIP.AUTO: NEGATIVE
HYALINE CASTS UR QL AUTO: NORMAL /LPF
IMM GRANULOCYTES # BLD AUTO: 0.01 10*3/MM3 (ref 0–0.05)
IMM GRANULOCYTES NFR BLD AUTO: 0.2 % (ref 0–0.5)
INR PPP: 1.24 (ref 0.9–1.1)
KETONES UR QL STRIP: NEGATIVE
LEUKOCYTE ESTERASE UR QL STRIP.AUTO: ABNORMAL
LYMPHOCYTES # BLD AUTO: 1.34 10*3/MM3 (ref 0.7–3.1)
LYMPHOCYTES NFR BLD AUTO: 28.5 % (ref 19.6–45.3)
MAGNESIUM SERPL-MCNC: 2.3 MG/DL (ref 1.6–2.4)
MCH RBC QN AUTO: 29.4 PG (ref 26.6–33)
MCH RBC QN AUTO: 29.4 PG (ref 26.6–33)
MCHC RBC AUTO-ENTMCNC: 34 G/DL (ref 31.5–35.7)
MCHC RBC AUTO-ENTMCNC: 34.2 G/DL (ref 31.5–35.7)
MCV RBC AUTO: 86.1 FL (ref 79–97)
MCV RBC AUTO: 86.5 FL (ref 79–97)
MONOCYTES # BLD AUTO: 0.41 10*3/MM3 (ref 0.1–0.9)
MONOCYTES NFR BLD AUTO: 8.7 % (ref 5–12)
NEUTROPHILS NFR BLD AUTO: 2.94 10*3/MM3 (ref 1.7–7)
NEUTROPHILS NFR BLD AUTO: 62.6 % (ref 42.7–76)
NITRITE UR QL STRIP: NEGATIVE
NRBC BLD AUTO-RTO: 0 /100 WBC (ref 0–0.2)
PH UR STRIP.AUTO: 5.5 [PH] (ref 5–8)
PLATELET # BLD AUTO: 166 10*3/MM3 (ref 140–450)
PLATELET # BLD AUTO: 182 10*3/MM3 (ref 140–450)
PMV BLD AUTO: 10.5 FL (ref 6–12)
PMV BLD AUTO: 11.4 FL (ref 6–12)
POTASSIUM SERPL-SCNC: 3 MMOL/L (ref 3.5–5.2)
POTASSIUM SERPL-SCNC: 3.5 MMOL/L (ref 3.5–5.2)
PROT SERPL-MCNC: 5.7 G/DL (ref 6–8.5)
PROT UR QL STRIP: NEGATIVE
PROTHROMBIN TIME: 15.4 SECONDS (ref 11.7–14.2)
QT INTERVAL: 407 MS
RBC # BLD AUTO: 4.45 10*6/MM3 (ref 4.14–5.8)
RBC # BLD AUTO: 4.52 10*6/MM3 (ref 4.14–5.8)
RBC # UR STRIP: NORMAL /HPF
REF LAB TEST METHOD: NORMAL
SARS-COV-2 RNA RESP QL NAA+PROBE: NOT DETECTED
SODIUM SERPL-SCNC: 140 MMOL/L (ref 136–145)
SP GR UR STRIP: 1.01 (ref 1–1.03)
SQUAMOUS #/AREA URNS HPF: NORMAL /HPF
TROPONIN T SERPL-MCNC: 0.02 NG/ML (ref 0–0.03)
TROPONIN T SERPL-MCNC: 0.02 NG/ML (ref 0–0.03)
UROBILINOGEN UR QL STRIP: ABNORMAL
WBC # UR STRIP: NORMAL /HPF
WBC NRBC COR # BLD: 4.57 10*3/MM3 (ref 3.4–10.8)
WBC NRBC COR # BLD: 4.7 10*3/MM3 (ref 3.4–10.8)

## 2022-01-26 PROCEDURE — 85025 COMPLETE CBC W/AUTO DIFF WBC: CPT | Performed by: EMERGENCY MEDICINE

## 2022-01-26 PROCEDURE — G0378 HOSPITAL OBSERVATION PER HR: HCPCS

## 2022-01-26 PROCEDURE — 81001 URINALYSIS AUTO W/SCOPE: CPT | Performed by: NURSE PRACTITIONER

## 2022-01-26 PROCEDURE — 85027 COMPLETE CBC AUTOMATED: CPT | Performed by: NURSE PRACTITIONER

## 2022-01-26 PROCEDURE — 83735 ASSAY OF MAGNESIUM: CPT | Performed by: NURSE PRACTITIONER

## 2022-01-26 PROCEDURE — 84132 ASSAY OF SERUM POTASSIUM: CPT | Performed by: NURSE PRACTITIONER

## 2022-01-26 PROCEDURE — 85610 PROTHROMBIN TIME: CPT | Performed by: EMERGENCY MEDICINE

## 2022-01-26 PROCEDURE — U0003 INFECTIOUS AGENT DETECTION BY NUCLEIC ACID (DNA OR RNA); SEVERE ACUTE RESPIRATORY SYNDROME CORONAVIRUS 2 (SARS-COV-2) (CORONAVIRUS DISEASE [COVID-19]), AMPLIFIED PROBE TECHNIQUE, MAKING USE OF HIGH THROUGHPUT TECHNOLOGIES AS DESCRIBED BY CMS-2020-01-R: HCPCS | Performed by: EMERGENCY MEDICINE

## 2022-01-26 PROCEDURE — 80053 COMPREHEN METABOLIC PANEL: CPT | Performed by: EMERGENCY MEDICINE

## 2022-01-26 PROCEDURE — 84484 ASSAY OF TROPONIN QUANT: CPT | Performed by: EMERGENCY MEDICINE

## 2022-01-26 PROCEDURE — 85730 THROMBOPLASTIN TIME PARTIAL: CPT | Performed by: EMERGENCY MEDICINE

## 2022-01-26 RX ORDER — TAMSULOSIN HYDROCHLORIDE 0.4 MG/1
0.4 CAPSULE ORAL DAILY
Status: DISCONTINUED | OUTPATIENT
Start: 2022-01-26 | End: 2022-01-26 | Stop reason: HOSPADM

## 2022-01-26 RX ORDER — HYDROXYZINE HYDROCHLORIDE 25 MG/1
25 TABLET, FILM COATED ORAL EVERY 6 HOURS PRN
Status: CANCELLED | OUTPATIENT
Start: 2022-01-26

## 2022-01-26 RX ORDER — SODIUM CHLORIDE 0.9 % (FLUSH) 0.9 %
10 SYRINGE (ML) INJECTION AS NEEDED
Status: DISCONTINUED | OUTPATIENT
Start: 2022-01-26 | End: 2022-01-26 | Stop reason: HOSPADM

## 2022-01-26 RX ORDER — POTASSIUM CHLORIDE 750 MG/1
40 TABLET, FILM COATED, EXTENDED RELEASE ORAL ONCE
Status: COMPLETED | OUTPATIENT
Start: 2022-01-26 | End: 2022-01-26

## 2022-01-26 RX ORDER — ASPIRIN 81 MG/1
81 TABLET ORAL DAILY
Status: DISCONTINUED | OUTPATIENT
Start: 2022-01-26 | End: 2022-01-26 | Stop reason: HOSPADM

## 2022-01-26 RX ORDER — CARVEDILOL 3.12 MG/1
12.5 TABLET ORAL EVERY 12 HOURS SCHEDULED
Status: DISCONTINUED | OUTPATIENT
Start: 2022-01-26 | End: 2022-01-26

## 2022-01-26 RX ORDER — TAMSULOSIN HYDROCHLORIDE 0.4 MG/1
0.4 CAPSULE ORAL DAILY
Status: CANCELLED | OUTPATIENT
Start: 2022-01-26

## 2022-01-26 RX ORDER — NITROGLYCERIN 0.4 MG/1
0.4 TABLET SUBLINGUAL
Status: DISCONTINUED | OUTPATIENT
Start: 2022-01-26 | End: 2022-01-26 | Stop reason: HOSPADM

## 2022-01-26 RX ORDER — ACETAMINOPHEN 325 MG/1
650 TABLET ORAL EVERY 4 HOURS PRN
Status: DISCONTINUED | OUTPATIENT
Start: 2022-01-26 | End: 2022-01-26 | Stop reason: HOSPADM

## 2022-01-26 RX ORDER — SIMETHICONE 80 MG
80 TABLET,CHEWABLE ORAL EVERY 6 HOURS PRN
Status: CANCELLED | OUTPATIENT
Start: 2022-01-26

## 2022-01-26 RX ORDER — ATORVASTATIN CALCIUM 80 MG/1
80 TABLET, FILM COATED ORAL DAILY
Status: CANCELLED | OUTPATIENT
Start: 2022-01-26

## 2022-01-26 RX ORDER — DESVENLAFAXINE SUCCINATE 50 MG/1
100 TABLET, EXTENDED RELEASE ORAL DAILY
Status: DISCONTINUED | OUTPATIENT
Start: 2022-01-26 | End: 2022-01-26 | Stop reason: HOSPADM

## 2022-01-26 RX ORDER — CARVEDILOL 3.12 MG/1
6.25 TABLET ORAL EVERY 12 HOURS SCHEDULED
Status: DISCONTINUED | OUTPATIENT
Start: 2022-01-26 | End: 2022-01-26 | Stop reason: HOSPADM

## 2022-01-26 RX ORDER — SPIRONOLACTONE 25 MG/1
25 TABLET ORAL DAILY
Status: CANCELLED | OUTPATIENT
Start: 2022-01-26

## 2022-01-26 RX ORDER — METAXALONE 800 MG/1
800 TABLET ORAL 3 TIMES DAILY PRN
Status: CANCELLED | OUTPATIENT
Start: 2022-01-26

## 2022-01-26 RX ORDER — NITROGLYCERIN 0.4 MG/1
0.4 TABLET SUBLINGUAL
Status: CANCELLED | OUTPATIENT
Start: 2022-01-26

## 2022-01-26 RX ORDER — DESVENLAFAXINE SUCCINATE 50 MG/1
100 TABLET, EXTENDED RELEASE ORAL DAILY
Status: CANCELLED | OUTPATIENT
Start: 2022-01-26

## 2022-01-26 RX ORDER — ALPRAZOLAM 0.25 MG/1
1 TABLET ORAL ONCE
Status: COMPLETED | OUTPATIENT
Start: 2022-01-26 | End: 2022-01-26

## 2022-01-26 RX ORDER — ASPIRIN 81 MG/1
81 TABLET ORAL DAILY
Status: CANCELLED | OUTPATIENT
Start: 2022-01-26

## 2022-01-26 RX ORDER — CARVEDILOL 12.5 MG/1
12.5 TABLET ORAL 2 TIMES DAILY WITH MEALS
Status: CANCELLED | OUTPATIENT
Start: 2022-01-26

## 2022-01-26 RX ORDER — ALPRAZOLAM 1 MG/1
2 TABLET ORAL NIGHTLY
Status: DISCONTINUED | OUTPATIENT
Start: 2022-01-26 | End: 2022-01-26 | Stop reason: HOSPADM

## 2022-01-26 RX ORDER — SODIUM CHLORIDE 0.9 % (FLUSH) 0.9 %
10 SYRINGE (ML) INJECTION EVERY 12 HOURS SCHEDULED
Status: DISCONTINUED | OUTPATIENT
Start: 2022-01-26 | End: 2022-01-26 | Stop reason: HOSPADM

## 2022-01-26 RX ORDER — PAROXETINE HYDROCHLORIDE 20 MG/1
40 TABLET, FILM COATED ORAL EVERY MORNING
Status: CANCELLED | OUTPATIENT
Start: 2022-01-26

## 2022-01-26 RX ORDER — LISINOPRIL 5 MG/1
5 TABLET ORAL DAILY
Status: CANCELLED | OUTPATIENT
Start: 2022-01-26

## 2022-01-26 RX ADMIN — ALPRAZOLAM 1 MG: 0.25 TABLET ORAL at 10:31

## 2022-01-26 RX ADMIN — CARVEDILOL 6.25 MG: 3.12 TABLET, FILM COATED ORAL at 08:01

## 2022-01-26 RX ADMIN — ASPIRIN 81 MG: 81 TABLET, COATED ORAL at 08:01

## 2022-01-26 RX ADMIN — POTASSIUM CHLORIDE 40 MEQ: 750 TABLET, EXTENDED RELEASE ORAL at 01:14

## 2022-01-26 RX ADMIN — SODIUM CHLORIDE, PRESERVATIVE FREE 10 ML: 5 INJECTION INTRAVENOUS at 08:01

## 2022-01-26 RX ADMIN — DESVENLAFAXINE SUCCINATE 100 MG: 50 TABLET, EXTENDED RELEASE ORAL at 08:01

## 2022-01-26 RX ADMIN — TAMSULOSIN HYDROCHLORIDE 0.4 MG: 0.4 CAPSULE ORAL at 08:01

## 2022-01-26 RX ADMIN — SODIUM CHLORIDE, PRESERVATIVE FREE 10 ML: 5 INJECTION INTRAVENOUS at 03:41

## 2022-01-26 NOTE — ED PROVIDER NOTES
EMERGENCY DEPARTMENT ENCOUNTER    Room Number:  119/1  Date of encounter:  1/26/2022  PCP: Provider, No Known  Historian: Patient      HPI:  Chief Complaint: Tingling and numbness  A complete HPI/ROS/PMH/PSH/SH/FH are unobtainable due to: None    Context: Jack Lopez is a 61 y.o. male who presents to the ED c/o right-sided tingling and numbness.  Patient states that he has been having some intermittent tingling and numbness to his right lower extremity for the past 3 days.  States that his muscles have also been drawing up intermittently.  Patient states that he was seen on Sunday and told that his problems were likely related to recent de-escalation of Xanax dosing and anxiety.  Patient states that his symptoms have continued and that approximately 2 hours ago he experienced new right-sided facial numbness and right upper extremity numbness with muscle contractions.  Patient called EMS.  Patient then was able to take a Xanax at his old dose which completely resolved the symptoms.  Patient arrives completely neurologically intact.      PAST MEDICAL HISTORY  Active Ambulatory Problems     Diagnosis Date Noted   • No Active Ambulatory Problems     Resolved Ambulatory Problems     Diagnosis Date Noted   • No Resolved Ambulatory Problems     Past Medical History:   Diagnosis Date   • Anxiety    • Asthma    • CAD (coronary artery disease)    • Chronic systolic CHF (congestive heart failure) ([facility])    • Depression    • Diabetes mellitus ([facility])    • Diastolic dysfunction    • Hyperlipidemia    • Hypertension    • Ischemic cardiomyopathy    • Mitral valve regurgitation    • Myocardial infarction ([facility])    • NSTEMI (non-ST elevated myocardial infarction) ([facility])    • Prostatic hypertrophy    • Seasonal allergies          PAST SURGICAL HISTORY  Past Surgical History:   Procedure Laterality Date   • CARDIAC SURGERY      CABG 4 vessel 2010         FAMILY HISTORY  Family History   Problem Relation Age of Onset   • No Known  Problems Mother    • No Known Problems Father    • No Known Problems Sister          SOCIAL HISTORY  Social History     Socioeconomic History   • Marital status: Single   Tobacco Use   • Smoking status: Never Smoker   Substance and Sexual Activity   • Alcohol use: No   • Drug use: No         ALLERGIES  Metformin        REVIEW OF SYSTEMS  Review of Systems     All systems reviewed and negative except for those discussed in HPI.       PHYSICAL EXAM    I have reviewed the triage vital signs and nursing notes.    ED Triage Vitals   Temp Heart Rate Resp BP SpO2   01/25/22 2130 01/25/22 2129 01/25/22 2129 01/25/22 2129 01/25/22 2129   98.6 °F (37 °C) 75 16 136/77 98 %      Temp src Heart Rate Source Patient Position BP Location FiO2 (%)   -- -- -- -- --              Physical Exam  GENERAL: not distressed  HENT: nares patent  EYES: no scleral icterus  CV: regular rhythm, regular rate  RESPIRATORY: normal effort, clear to auscultation laterally  ABDOMEN: soft, nontender nondistended  MUSCULOSKELETAL: no deformity  NEURO: alert, moves all extremities, follows commands, sensation intact bilaterally, cranial nerves intact, 5 out of 5 upper and lower extremity strength, coordination intact, speech is fluent  SKIN: warm, dry        LAB RESULTS  Recent Results (from the past 24 hour(s))   ECG 12 Lead    Collection Time: 01/25/22 11:31 PM   Result Value Ref Range    QT Interval 407 ms   Comprehensive Metabolic Panel    Collection Time: 01/26/22 12:09 AM    Specimen: Blood   Result Value Ref Range    Glucose 115 (H) 65 - 99 mg/dL    BUN 7 (L) 8 - 23 mg/dL    Creatinine 0.84 0.76 - 1.27 mg/dL    Sodium 140 136 - 145 mmol/L    Potassium 3.0 (L) 3.5 - 5.2 mmol/L    Chloride 109 (H) 98 - 107 mmol/L    CO2 22.3 22.0 - 29.0 mmol/L    Calcium 7.3 (L) 8.6 - 10.5 mg/dL    Total Protein 5.7 (L) 6.0 - 8.5 g/dL    Albumin 3.50 3.50 - 5.20 g/dL    ALT (SGPT) 44 (H) 1 - 41 U/L    AST (SGOT) 43 (H) 1 - 40 U/L    Alkaline Phosphatase 62 39 - 117  U/L    Total Bilirubin 0.4 0.0 - 1.2 mg/dL    eGFR Non African Amer 93 >60 mL/min/1.73    Globulin 2.2 gm/dL    A/G Ratio 1.6 g/dL    BUN/Creatinine Ratio 8.3 7.0 - 25.0    Anion Gap 8.7 5.0 - 15.0 mmol/L   Protime-INR    Collection Time: 01/26/22 12:09 AM    Specimen: Blood   Result Value Ref Range    Protime 15.4 (H) 11.7 - 14.2 Seconds    INR 1.24 (H) 0.90 - 1.10   aPTT    Collection Time: 01/26/22 12:09 AM    Specimen: Blood   Result Value Ref Range    PTT 29.7 22.7 - 35.4 seconds   Troponin    Collection Time: 01/26/22 12:09 AM    Specimen: Blood   Result Value Ref Range    Troponin T 0.022 0.000 - 0.030 ng/mL   CBC Auto Differential    Collection Time: 01/26/22 12:09 AM    Specimen: Blood   Result Value Ref Range    WBC 4.70 3.40 - 10.80 10*3/mm3    RBC 4.45 4.14 - 5.80 10*6/mm3    Hemoglobin 13.1 13.0 - 17.7 g/dL    Hematocrit 38.3 37.5 - 51.0 %    MCV 86.1 79.0 - 97.0 fL    MCH 29.4 26.6 - 33.0 pg    MCHC 34.2 31.5 - 35.7 g/dL    RDW 12.5 12.3 - 15.4 %    RDW-SD 38.9 37.0 - 54.0 fl    MPV 11.4 6.0 - 12.0 fL    Platelets 182 140 - 450 10*3/mm3    Neutrophil % 62.6 42.7 - 76.0 %    Lymphocyte % 28.5 19.6 - 45.3 %    Monocyte % 8.7 5.0 - 12.0 %    Eosinophil % 0.0 (L) 0.3 - 6.2 %    Basophil % 0.0 0.0 - 1.5 %    Immature Grans % 0.2 0.0 - 0.5 %    Neutrophils, Absolute 2.94 1.70 - 7.00 10*3/mm3    Lymphocytes, Absolute 1.34 0.70 - 3.10 10*3/mm3    Monocytes, Absolute 0.41 0.10 - 0.90 10*3/mm3    Eosinophils, Absolute 0.00 0.00 - 0.40 10*3/mm3    Basophils, Absolute 0.00 0.00 - 0.20 10*3/mm3    Immature Grans, Absolute 0.01 0.00 - 0.05 10*3/mm3    nRBC 0.0 0.0 - 0.2 /100 WBC   COVID-19,[facility] IN-HOUSE CEPHEID/KIANA NP SWAB IN TRANSPORT MEDIA 8-12 HR TAT - Swab, Nasopharynx    Collection Time: 01/26/22 12:38 AM    Specimen: Nasopharynx; Swab   Result Value Ref Range    COVID19 Not Detected Not Detected - Ref. Range   Troponin    Collection Time: 01/26/22  1:16 AM    Specimen: Blood   Result Value Ref Range     Troponin T 0.020 0.000 - 0.030 ng/mL   CBC (No Diff)    Collection Time: 01/26/22  5:04 AM    Specimen: Blood   Result Value Ref Range    WBC 4.57 3.40 - 10.80 10*3/mm3    RBC 4.52 4.14 - 5.80 10*6/mm3    Hemoglobin 13.3 13.0 - 17.7 g/dL    Hematocrit 39.1 37.5 - 51.0 %    MCV 86.5 79.0 - 97.0 fL    MCH 29.4 26.6 - 33.0 pg    MCHC 34.0 31.5 - 35.7 g/dL    RDW 12.8 12.3 - 15.4 %    RDW-SD 39.7 37.0 - 54.0 fl    MPV 10.5 6.0 - 12.0 fL    Platelets 166 140 - 450 10*3/mm3       Ordered the above labs and independently reviewed the results.        RADIOLOGY  CT Head Without Contrast    Result Date: 1/25/2022  CT HEAD WITHOUT CONTRAST  HISTORY: Right-sided numbness and tingling  COMPARISON: None available.  TECHNIQUE: Axial CT imaging was obtained through the brain. No IV contrast was administered.  FINDINGS: No acute intracranial hemorrhage is seen. There is mild atrophy. There is no midline shift or mass effect. There is no significant microangiopathic change. The mastoid air cells appear clear. Mucous retention cysts are incompletely visualized within the left maxillary sinus.      No acute intracranial findings.  Radiation dose reduction techniques were utilized, including automated exposure control and exposure modulation based on body size.  This report was finalized on 1/25/2022 11:52 PM by Dr. Modesta Bright M.D.        I ordered the above noted radiological studies. Reviewed by me and discussed with radiologist.  See dictation for official radiology interpretation.      PROCEDURES    Procedures      MEDICATIONS GIVEN IN ER    Medications   sodium chloride 0.9 % flush 10 mL (has no administration in time range)   sodium chloride 0.9 % flush 10 mL (10 mL Intravenous Given 1/26/22 0341)   sodium chloride 0.9 % flush 10 mL (has no administration in time range)   acetaminophen (TYLENOL) tablet 650 mg (has no administration in time range)   ALPRAZolam (XANAX) tablet 2 mg (has no administration in time range)    aspirin EC tablet 81 mg (has no administration in time range)   desvenlafaxine (PRISTIQ) 24 hr tablet 100 mg (has no administration in time range)   nitroglycerin (NITROSTAT) SL tablet 0.4 mg (has no administration in time range)   tamsulosin (FLOMAX) 24 hr capsule 0.4 mg (has no administration in time range)   carvedilol (COREG) tablet 6.25 mg (has no administration in time range)   potassium chloride (K-DUR,KLOR-CON) ER tablet 40 mEq (40 mEq Oral Given 1/26/22 0114)         PROGRESS, DATA ANALYSIS, CONSULTS, AND MEDICAL DECISION MAKING    All labs have been independently reviewed by me.  All radiology studies have been reviewed by me and discussed with radiologist dictating the report.   EKG's independently viewed and interpreted by me.  Discussion below represents my analysis of pertinent findings related to patient's condition, differential diagnosis, treatment plan and final disposition.        ED Course as of 01/26/22 0608   Tue Jan 25, 2022   2323 Discussed with Dr. Verma.  Patient with unusual presentation but there is still concern for TIA.  Will obtain CT without anticipate observation and MRI. [TJ]   2334 EKG          EKG time: 2331  Rhythm/Rate: Sinus rhythm rate 73  P waves and MS: Normal  QRS, axis: Narrow regular  ST and T waves: Nonspecific    Interpreted Contemporaneously by me, independently viewed  No significant changed compared to prior EKG from 1/5/2018   [TJ]   Wed Jan 26, 2022   0103 Discussed with provider on-call for observation unit.  Happy to admit the patient. [TJ]      ED Course User Index  [TJ] Clay Mcmullen MD           PPE: The patient wore a surgical mask throughout the entire patient encounter. I wore an N95.    AS OF 06:08 EST VITALS:    BP - 110/77  HR - 86  TEMP - 98.6 °F (37 °C) (Oral)  O2 SATS - 100%        DIAGNOSIS  Final diagnoses:   TIA (transient ischemic attack)         DISPOSITION  Admit to obs unit           Clay Mcmullen MD  01/26/22 0608

## 2022-01-26 NOTE — PROGRESS NOTES
ED OBSERVATION PROGRESS/DISCHARGE SUMMARY    Date of Admission: 1/25/2022   LOS: 0 days   PCP: Provider, No Known    Final Diagnosis paresthesia, hypokalemia      Subjective     Patient is a pleasant afebrile ambulatory 61-year-old  male who was admitted to the observation unit overnight for right-sided paresthesias.  He states his been intermittent for the past 3 days.  He reports that his primary care has been titrating his Xanax as he is hoping to discontinue this and that is when his symptoms started.  He was given a dose of Xanax here in the observation unit and his symptoms have resolved.  He states he was feeling much better this morning however subsequently reported that he was having paresthesias to the tip of his penis but attributed this to frequent masturbation.  We gave patient an additional dose of his home benzodiazepine and he reported that the symptom resolved as well.    Hospital Outcome: Symptoms have resolved    ROS:  General: no fevers, chills  Respiratory: no cough, dyspnea  Cardiovascular: no chest pain, palpitations  Abdomen: No abdominal pain, nausea, vomiting, or diarrhea  Neurologic: No focal weakness, paresthesias, weakness, numbness    Objective   Physical Exam:  I have reviewed the vital signs.  Temp:  [97.7 °F (36.5 °C)-98.6 °F (37 °C)] 97.7 °F (36.5 °C)  Heart Rate:  [63-86] 71  Resp:  [16-18] 18  BP: ()/(63-87) 99/64  General Appearance:    Alert, cooperative, no distress  Head:    Normocephalic, atraumatic  Eyes:    Sclerae anicteric  Neck:   Supple, no mass  Lungs: Clear to auscultation bilaterally, respirations unlabored  Heart: Regular rate and rhythm, S1 and S2 normal, no murmur, rub or gallop  Abdomen:  Soft, non-tender, bowel sounds active, nondistended  Extremities: No clubbing, cyanosis, or edema to lower extremities  Pulses:  2+ and symmetric in distal lower extremities  Skin: No rashes   Neurologic: Oriented x3, Normal strength to extremities  Psychiatric:  Bizarre affect, nonsuicidal    Results Review:    I have reviewed the labs, radiology results and diagnostic studies.    Results from last 7 days   Lab Units 01/26/22  0504   WBC 10*3/mm3 4.57   HEMOGLOBIN g/dL 13.3   HEMATOCRIT % 39.1   PLATELETS 10*3/mm3 166     Results from last 7 days   Lab Units 01/26/22  0757 01/26/22  0009 01/24/22  0129   SODIUM mmol/L  --  140 133*   POTASSIUM mmol/L 3.5 3.0* 3.9   CHLORIDE mmol/L  --  109* 99   CO2 mmol/L  --  22.3 23.2   BUN mg/dL  --  7* 11   CREATININE mg/dL  --  0.84 0.97   CALCIUM mg/dL  --  7.3* 8.3*   BILIRUBIN mg/dL  --  0.4 0.5   ALK PHOS U/L  --  62 83   ALT (SGPT) U/L  --  44* 62*   AST (SGOT) U/L  --  43* 57*   GLUCOSE mg/dL  --  115* 121*     Imaging Results (Last 24 Hours)     Procedure Component Value Units Date/Time    CT Head Without Contrast [[id number]] Collected: 01/25/22 2349     Updated: 01/25/22 2355    Narrative:      CT HEAD WITHOUT CONTRAST     HISTORY: Right-sided numbness and tingling     COMPARISON: None available.     TECHNIQUE: Axial CT imaging was obtained through the brain. No IV  contrast was administered.     FINDINGS:  No acute intracranial hemorrhage is seen. There is mild atrophy. There  is no midline shift or mass effect. There is no significant  microangiopathic change. The mastoid air cells appear clear. Mucous  retention cysts are incompletely visualized within the left maxillary  sinus.       Impression:      No acute intracranial findings.     Radiation dose reduction techniques were utilized, including automated  exposure control and exposure modulation based on body size.     This report was finalized on 1/25/2022 11:52 PM by Dr. Modesta Bright M.D.             I have reviewed the medications.  ---------------------------------------------------------------------------------------------  Assessment/Plan   Assessment/Problem List    TIA (transient ischemic attack)      Plan:    Paresthesia  -symptoms disappeared after full  dose xanax  -CT head negative     Hypokalemia  -replete, recheck today shows improvement to 3.5     HLD  -continue home statin         Disposition: Home    Follow-up after Discharge: PCP    This note will serve as a discharge summary    AL Rueda 01/26/22 12:28 EST          I have worn appropriate PPE during this patient encounter, sanitized my hands both with entering and exiting patient's room.

## 2022-01-26 NOTE — H&P
[facility]   HISTORY AND PHYSICAL    Patient Name: Jack Lopez  : 1960  MRN: [id number]  Primary Care Physician:  Vahe, No Known  Date of admission: 2022    Subjective   Subjective     Chief Complaint:   Chief Complaint   Patient presents with   • Numbness         HPI:    Jack Lopez is a 61 y.o. male who presented to the ER complaining of right-sided tingling and numbness for the past 3 days. Also complains of muscle spasms. He states his xanax dose was recently decreased. He took a xanax at his usual dose which resolved his symptoms. He is admitted to [facility] for further evaluation.    Review of Systems   Negative except what is discussed in the HPI    Personal History     Past Medical History:   Diagnosis Date   • Anxiety    • Asthma    • CAD (coronary artery disease)    • Chronic systolic CHF (congestive heart failure) ([facility])    • Depression    • Diabetes mellitus ([facility])    • Diastolic dysfunction    • Hyperlipidemia    • Hypertension    • Ischemic cardiomyopathy    • Mitral valve regurgitation    • Myocardial infarction ([facility])    • NSTEMI (non-ST elevated myocardial infarction) ([facility])    • Prostatic hypertrophy     benign   • Seasonal allergies        Past Surgical History:   Procedure Laterality Date   • CARDIAC SURGERY      CABG 4 vessel        Family History: family history includes No Known Problems in his father, mother, and sister. Otherwise pertinent FHx was reviewed and not pertinent to current issue.    Social History:  reports that he has never smoked. He does not have any smokeless tobacco history on file. He reports that he does not drink alcohol and does not use drugs.    Home Medications:  ALPRAZolam, Lemborexant, PARoxetine, amphetamine-dextroamphetamine, aspirin, atorvastatin, carvedilol, desvenlafaxine, glipizide, hydrOXYzine, lisinopril, metaxalone, naproxen sodium, nitroglycerin, simethicone, spironolactone, and tamsulosin    Allergies:  Allergies   Allergen  Reactions   • Metformin Diarrhea and Unknown - Low Severity     diarrhea       Objective   Objective     Vitals:   Temp:  [98.6 °F (37 °C)] 98.6 °F (37 °C)  Heart Rate:  [75-86] 86  Resp:  [16] 16  BP: (101-136)/(63-77) 110/77  Physical Exam    Constitutional: Awake, alert   Eyes: PERRLA, sclerae anicteric, no conjunctival injection   HENT: NCAT, mucous membranes moist   Neck: Supple, no thyromegaly, no lymphadenopathy, trachea midline   Respiratory: Clear to auscultation bilaterally, nonlabored respirations    Cardiovascular: RRR, no murmurs, rubs, or gallops, palpable pedal pulses bilaterally   Gastrointestinal: Positive bowel sounds, soft, nontender, nondistended   Musculoskeletal: No bilateral ankle edema, no clubbing or cyanosis to extremities   Psychiatric: Appropriate affect, cooperative   Neurologic: Oriented x 3, strength symmetric in all extremities, Cranial Nerves grossly intact to confrontation, speech clear   Skin: No rashes     Result Review    Result Review:  I have personally reviewed the results from the time of this admission to 1/26/2022 04:00 EST and agree with these findings:  [x]  Laboratory  [x]  Microbiology  [x]  Radiology  [x]  EKG/Telemetry   [x]  Cardiology/Vascular   [x]  Pathology  [x]  Old records  []  Other:      Assessment/Plan   Assessment / Plan       Active Hospital Problems:  Active Hospital Problems    Diagnosis    • TIA (transient ischemic attack)      Plan:   TIA  -symptoms disappeared after full dose xanax  -CT head negative    Hypokalemia  -replete     HLD  -continue home statin      DVT prophylaxis:  Mechanical DVT prophylaxis orders are present.    CODE STATUS:    Level Of Support Discussed With: Patient  Code Status (Patient has no pulse and is not breathing): CPR (Attempt to Resuscitate)  Medical Interventions (Patient has pulse or is breathing): Full Support    Admission Status:  I believe this patient meets observation status.    Electronically signed by Colleen ALFORD  AL Camacho, 01/26/22, 4:00 AM EST.

## 2022-01-26 NOTE — ED NOTES
Pt arrives via EMS with c/o right sided numbness in leg and arm that started yesterday. Pt was seen here on Sunday for similar, diagnosed with panic attacks. EMS reports pt has been weaning off of the Xanax.  Pt took Xanax PTA and numbness has since improved.    Pt has no neuro deficits noted at this time.    Pt given mask in triage, staff in PPE.       Tatiana Bonds, CARLEE  01/25/22 0823

## 2022-01-26 NOTE — PLAN OF CARE
Goal Outcome Evaluation:  Patient woke up confused this afternoon.  He was in a deep sleep.  He has not slept in 2 days.  Instructed patient he needs to rest and try to sleep when he gets home.  He has an appointment to see his psychiatrist Tuesday.  He is stable for DC his sister plans to transport home. No changes to his meds.

## 2023-05-15 ENCOUNTER — HOSPITAL ENCOUNTER (EMERGENCY)
Facility: HOSPITAL | Age: 63
Discharge: HOME | End: 2023-05-15
Attending: EMERGENCY MEDICINE | Admitting: EMERGENCY MEDICINE
Payer: MEDICAID

## 2023-05-15 ENCOUNTER — APPOINTMENT (OUTPATIENT)
Dept: GENERAL RADIOLOGY | Facility: HOSPITAL | Age: 63
End: 2023-05-15
Payer: MEDICAID

## 2023-05-15 VITALS
SYSTOLIC BLOOD PRESSURE: 133 MMHG | DIASTOLIC BLOOD PRESSURE: 89 MMHG | TEMPERATURE: 97.6 F | BODY MASS INDEX: 25.92 KG/M2 | RESPIRATION RATE: 18 BRPM | HEART RATE: 100 BPM | WEIGHT: 175 LBS | OXYGEN SATURATION: 98 % | HEIGHT: 69 IN

## 2023-05-15 DIAGNOSIS — R06.00 DYSPNEA, UNSPECIFIED TYPE: Primary | ICD-10-CM

## 2023-05-15 LAB
ALBUMIN SERPL-MCNC: 4 G/DL (ref 3.5–5.2)
ALBUMIN/GLOB SERPL: 1.5 G/DL
ALP SERPL-CCNC: 73 U/L (ref 39–117)
ALT SERPL W P-5'-P-CCNC: 33 U/L (ref 1–41)
ANION GAP SERPL CALCULATED.3IONS-SCNC: 9.8 MMOL/L (ref 5–15)
APTT PPP: 30.5 SECONDS (ref 22.7–35.4)
AST SERPL-CCNC: 36 U/L (ref 1–40)
B PARAPERT DNA SPEC QL NAA+PROBE: NOT DETECTED
B PERT DNA SPEC QL NAA+PROBE: NOT DETECTED
BASOPHILS # BLD AUTO: 0 10*3/MM3 (ref 0–0.2)
BASOPHILS NFR BLD AUTO: 0 % (ref 0–1.5)
BILIRUB SERPL-MCNC: 0.6 MG/DL (ref 0–1.2)
BUN SERPL-MCNC: 11 MG/DL (ref 8–23)
BUN/CREAT SERPL: 11.1 (ref 7–25)
C PNEUM DNA NPH QL NAA+NON-PROBE: NOT DETECTED
CALCIUM SPEC-SCNC: 8.7 MG/DL (ref 8.6–10.5)
CHLORIDE SERPL-SCNC: 105 MMOL/L (ref 98–107)
CO2 SERPL-SCNC: 24.2 MMOL/L (ref 22–29)
CREAT SERPL-MCNC: 0.99 MG/DL (ref 0.76–1.27)
DEPRECATED RDW RBC AUTO: 40.2 FL (ref 37–54)
EGFRCR SERPLBLD CKD-EPI 2021: 86.7 ML/MIN/1.73
EOSINOPHIL # BLD AUTO: 0 10*3/MM3 (ref 0–0.4)
EOSINOPHIL NFR BLD AUTO: 0 % (ref 0.3–6.2)
ERYTHROCYTE [DISTWIDTH] IN BLOOD BY AUTOMATED COUNT: 13 % (ref 12.3–15.4)
FLUAV SUBTYP SPEC NAA+PROBE: NOT DETECTED
FLUBV RNA ISLT QL NAA+PROBE: NOT DETECTED
GEN 5 2HR TROPONIN T REFLEX: 67 NG/L
GLOBULIN UR ELPH-MCNC: 2.6 GM/DL
GLUCOSE SERPL-MCNC: 147 MG/DL (ref 65–99)
HADV DNA SPEC NAA+PROBE: NOT DETECTED
HCOV 229E RNA SPEC QL NAA+PROBE: NOT DETECTED
HCOV HKU1 RNA SPEC QL NAA+PROBE: NOT DETECTED
HCOV NL63 RNA SPEC QL NAA+PROBE: NOT DETECTED
HCOV OC43 RNA SPEC QL NAA+PROBE: NOT DETECTED
HCT VFR BLD AUTO: 43.7 % (ref 37.5–51)
HGB BLD-MCNC: 14.6 G/DL (ref 13–17.7)
HMPV RNA NPH QL NAA+NON-PROBE: NOT DETECTED
HPIV1 RNA ISLT QL NAA+PROBE: NOT DETECTED
HPIV2 RNA SPEC QL NAA+PROBE: NOT DETECTED
HPIV3 RNA NPH QL NAA+PROBE: NOT DETECTED
HPIV4 P GENE NPH QL NAA+PROBE: NOT DETECTED
IMM GRANULOCYTES # BLD AUTO: 0.04 10*3/MM3 (ref 0–0.05)
IMM GRANULOCYTES NFR BLD AUTO: 0.6 % (ref 0–0.5)
INR PPP: 1.18 (ref 0.9–1.1)
LYMPHOCYTES # BLD AUTO: 1.37 10*3/MM3 (ref 0.7–3.1)
LYMPHOCYTES NFR BLD AUTO: 21 % (ref 19.6–45.3)
M PNEUMO IGG SER IA-ACNC: NOT DETECTED
MCH RBC QN AUTO: 28.7 PG (ref 26.6–33)
MCHC RBC AUTO-ENTMCNC: 33.4 G/DL (ref 31.5–35.7)
MCV RBC AUTO: 85.9 FL (ref 79–97)
MONOCYTES # BLD AUTO: 0.37 10*3/MM3 (ref 0.1–0.9)
MONOCYTES NFR BLD AUTO: 5.7 % (ref 5–12)
NEUTROPHILS NFR BLD AUTO: 4.73 10*3/MM3 (ref 1.7–7)
NEUTROPHILS NFR BLD AUTO: 72.7 % (ref 42.7–76)
NRBC BLD AUTO-RTO: 0 /100 WBC (ref 0–0.2)
NT-PROBNP SERPL-MCNC: 820 PG/ML (ref 0–900)
PLATELET # BLD AUTO: 162 10*3/MM3 (ref 140–450)
PMV BLD AUTO: 9.9 FL (ref 6–12)
POTASSIUM SERPL-SCNC: 4.7 MMOL/L (ref 3.5–5.2)
PROCALCITONIN SERPL-MCNC: 0.07 NG/ML (ref 0–0.25)
PROT SERPL-MCNC: 6.6 G/DL (ref 6–8.5)
PROTHROMBIN TIME: 15.1 SECONDS (ref 11.7–14.2)
QT INTERVAL: 355 MS
RBC # BLD AUTO: 5.09 10*6/MM3 (ref 4.14–5.8)
RHINOVIRUS RNA SPEC NAA+PROBE: NOT DETECTED
RSV RNA NPH QL NAA+NON-PROBE: NOT DETECTED
SARS-COV-2 RNA NPH QL NAA+NON-PROBE: NOT DETECTED
SODIUM SERPL-SCNC: 139 MMOL/L (ref 136–145)
TROPONIN T DELTA: -2 NG/L
TROPONIN T SERPL HS-MCNC: 69 NG/L
WBC NRBC COR # BLD: 6.51 10*3/MM3 (ref 3.4–10.8)

## 2023-05-15 PROCEDURE — 80053 COMPREHEN METABOLIC PANEL: CPT | Performed by: EMERGENCY MEDICINE

## 2023-05-15 PROCEDURE — 85025 COMPLETE CBC W/AUTO DIFF WBC: CPT | Performed by: EMERGENCY MEDICINE

## 2023-05-15 PROCEDURE — 93005 ELECTROCARDIOGRAM TRACING: CPT | Performed by: EMERGENCY MEDICINE

## 2023-05-15 PROCEDURE — 83880 ASSAY OF NATRIURETIC PEPTIDE: CPT | Performed by: EMERGENCY MEDICINE

## 2023-05-15 PROCEDURE — 85730 THROMBOPLASTIN TIME PARTIAL: CPT | Performed by: EMERGENCY MEDICINE

## 2023-05-15 PROCEDURE — 0202U NFCT DS 22 TRGT SARS-COV-2: CPT | Performed by: EMERGENCY MEDICINE

## 2023-05-15 PROCEDURE — 84484 ASSAY OF TROPONIN QUANT: CPT | Performed by: EMERGENCY MEDICINE

## 2023-05-15 PROCEDURE — 84145 PROCALCITONIN (PCT): CPT | Performed by: EMERGENCY MEDICINE

## 2023-05-15 PROCEDURE — 99284 EMERGENCY DEPT VISIT MOD MDM: CPT

## 2023-05-15 PROCEDURE — 85610 PROTHROMBIN TIME: CPT | Performed by: EMERGENCY MEDICINE

## 2023-05-15 PROCEDURE — 36415 COLL VENOUS BLD VENIPUNCTURE: CPT

## 2023-05-15 PROCEDURE — 71045 X-RAY EXAM CHEST 1 VIEW: CPT

## 2023-05-15 RX ORDER — SODIUM CHLORIDE 0.9 % (FLUSH) 0.9 %
10 SYRINGE (ML) INJECTION AS NEEDED
Status: DISCONTINUED | OUTPATIENT
Start: 2023-05-15 | End: 2023-05-15 | Stop reason: HOSPADM

## 2023-05-15 RX ORDER — BENZONATATE 200 MG/1
200 CAPSULE ORAL 3 TIMES DAILY PRN
Qty: 21 CAPSULE | Refills: 0 | Status: SHIPPED | OUTPATIENT
Start: 2023-05-15 | End: 2023-05-15

## 2023-05-15 RX ORDER — GUAIFENESIN 600 MG/1
1200 TABLET, EXTENDED RELEASE ORAL 2 TIMES DAILY
Qty: 40 TABLET | Refills: 0 | Status: SHIPPED | OUTPATIENT
Start: 2023-05-15 | End: 2023-05-15

## 2023-05-15 RX ORDER — ASPIRIN 81 MG/1
324 TABLET, CHEWABLE ORAL ONCE
Status: COMPLETED | OUTPATIENT
Start: 2023-05-15 | End: 2023-05-15

## 2023-05-15 RX ADMIN — ASPIRIN 324 MG: 81 TABLET, CHEWABLE ORAL at 06:56

## 2023-05-15 NOTE — DISCHARGE INSTRUCTIONS
Return to the emergency department for worsening symptoms, chest pain, fever, or other concern.  Follow-up with your primary care provider if symptoms are not improving in the next few days.

## 2023-05-15 NOTE — ED PROVIDER NOTES
EMERGENCY DEPARTMENT ENCOUNTER    Room Number:  10/10  Date of encounter:  5/18/2023  PCP: Alvaro Geronimo APRN  Patient Care Team:  Alvaro Geronimo APRN as PCP - General (Nurse Practitioner)   Independent Historians: Patient    HPI:  Chief Complaint: Dyspnea    A complete HPI/ROS/PMH/PSH/SH/FH are unobtainable due to: None    Chronic or social conditions impacting patient care (Social Determinants of Health): None  (Financial Resource Strain / Food Insecurity / Transportation Needs / Physical Activity / Stress / Social Connections / Intimate Partner Violence / Housing Stability)    Context: Jack Lopez is a 62 y.o. male who presents to the ED c/o acute dyspnea.  States for the last 2 days he become short of breath and has a cough when he attempts to lay flat.  Denies chest pain no fevers or chills.  Patient has prior history of CABG.  No fevers or chills no abdominal pain.  No diaphoresis nausea vomiting.    Review of prior external notes (non-ED) -and- Review of prior external test results outside of this encounter: I have reviewed patient's primary care visit from 8/4/2022    Prescription drug monitoring program review:         PAST MEDICAL HISTORY  Active Ambulatory Problems     Diagnosis Date Noted   • Paresthesia 01/26/2022   • Hypokalemia 01/26/2022     Resolved Ambulatory Problems     Diagnosis Date Noted   • TIA (transient ischemic attack) 01/26/2022     Past Medical History:   Diagnosis Date   • Anxiety    • Asthma    • CAD (coronary artery disease)    • Chronic systolic CHF (congestive heart failure)    • Depression    • Diabetes mellitus    • Diastolic dysfunction    • Hyperlipidemia    • Hypertension    • Ischemic cardiomyopathy    • Mitral valve regurgitation    • Myocardial infarction    • NSTEMI (non-ST elevated myocardial infarction)    • Prostatic hypertrophy    • Seasonal allergies          PAST SURGICAL HISTORY  Past Surgical History:   Procedure Laterality Date   • CARDIAC SURGERY       CABG 4 vessel 2010         FAMILY HISTORY  Family History   Problem Relation Age of Onset   • No Known Problems Mother    • No Known Problems Father    • No Known Problems Sister          SOCIAL HISTORY  Social History     Socioeconomic History   • Marital status: Single   Tobacco Use   • Smoking status: Never   Substance and Sexual Activity   • Alcohol use: No   • Drug use: No         ALLERGIES  Metformin        REVIEW OF SYSTEMS  Review of Systems  Included in HPI  All systems reviewed and negative except for those discussed in HPI.      PHYSICAL EXAM    I have reviewed the triage vital signs and nursing notes.    ED Triage Vitals [05/15/23 0347]   Temp Heart Rate Resp BP SpO2   97.6 °F (36.4 °C) 101 18 (!) 141/108 97 %      Temp src Heart Rate Source Patient Position BP Location FiO2 (%)   Tympanic Monitor Standing Right arm --       Physical Exam  GENERAL: alert, no acute distress  SKIN: Warm, dry  HENT: Normocephalic, atraumatic  EYES: no scleral icterus  CV: regular rhythm, regular rate  RESPIRATORY: normal effort, lungs clear  ABDOMEN: soft, nontender, nondistended  MUSCULOSKELETAL: no deformity  NEURO: alert, moves all extremities, follows commands                                                               LAB RESULTS  No results found for this or any previous visit (from the past 24 hour(s)).    Ordered the above labs and independently reviewed the results.        RADIOLOGY  No Radiology Exams Resulted Within Past 24 Hours    I ordered the above noted radiological studies. Reviewed by me and discussed with radiologist.  See dictation for official radiology interpretation.      PROCEDURES    Procedures      MEDICATIONS GIVEN IN ER    Medications   aspirin chewable tablet 324 mg (324 mg Oral Given 5/15/23 0656)         ORDERS PLACED DURING THIS VISIT:  Orders Placed This Encounter   Procedures   • Respiratory Panel PCR w/COVID-19(SARS-CoV-2) KACIE/VARINDER/JEFF/PAD/COR/MAD/LEN In-House, NP Swab in UTM/VTM, 3-4 HR  TAT - Swab, Nasopharynx   • XR Chest 1 View   • Comprehensive Metabolic Panel   • Protime-INR   • aPTT   • BNP   • High Sensitivity Troponin T   • Procalcitonin   • CBC Auto Differential   • High Sensitivity Troponin T 2Hr   • Monitor Blood Pressure   • Pulse Oximetry, Continuous   • ECG 12 Lead Dyspnea   • CBC & Differential         PROGRESS, DATA ANALYSIS, CONSULTS, AND MEDICAL DECISION MAKING    All labs have been independently interpreted by me.  All radiology studies have been reviewed by me and discussed with radiologist dictating the report.   EKG's independently viewed and interpreted by me.  Discussion below represents my analysis of pertinent findings related to patient's condition, differential diagnosis, treatment plan and final disposition.    My differential diagnosis for dyspnea includes but is not limited to:  Asthma, COPD, pneumonia, pulmonary embolus, acute respiratory distress syndrome, pneumothorax, pleural effusion, pulmonary fibrosis, congestive heart failure, myocardial infarction, DKA, uremia, acidosis, sepsis, anemia, drug related, hyperventilation, CNS disease      ED Course as of 05/18/23 0350   Mon May 15, 2023   0407 EKG          EKG time: 0357  Rhythm/Rate: Sinus tachycardia rate 102  P waves and HI: Normal  QRS, axis: Narrow regular  ST and T waves: Nonspecific     Interpreted Contemporaneously by me, independently viewed  No significant changed compared to prior EKG from 1/25/2022   [TJ]   0620 Procalcitonin: 0.07 [TJ]   0620 proBNP: 820.0 [TJ]   0642 I have independently reviewed patient's chest x-ray; my interpretation is no infiltrate no pneumothorax [TJ]   0753 Care to Dr. Gill.  Will follow-up 2-hour troponin. [TJ]   0835 HS Troponin T(!!): 67 []   0835 Troponin T Delta: -2 []      ED Course User Index  [TJ] Clay Mcmullen MD  [] Tyrese Allan MD       I interpreted the cardiac monitor rhythm and my independent interpretation is: normal sinus rhythm.      PPE: The patient wore a mask and I wore an N95 mask throughout the entire patient encounter.       AS OF 03:50 EDT VITALS:    BP - 133/89  HR - 100  TEMP - 97.6 °F (36.4 °C) (Tympanic)  O2 SATS - 98%        DIAGNOSIS  Final diagnoses:   Dyspnea, unspecified type         DISPOSITION  ED Disposition     ED Disposition   Discharge    Condition   Stable    Comment   --                Note Disclaimer: At [facility], we believe that sharing information builds trust and better relationships. You are receiving this note because you recently visited [facility]. It is possible you will see health information before a provider has talked with you about it. This kind of information can be easy to misunderstand. To help you fully understand what it means for your health, we urge you to discuss this note with your provider.       Clay Mcmullen MD  05/15/23 0754       Clay Mcmullen MD  05/18/23 0352

## 2023-05-15 NOTE — ED NOTES
Pt ambulatory to triage from home with c/o short of air for the last few nights, states he feels like his lungs fill up when he lays flat.  Pt history of cabg.

## 2023-06-06 LAB — QT INTERVAL: 355 MS

## 2024-01-10 ENCOUNTER — HOSPITAL ENCOUNTER (EMERGENCY)
Facility: HOSPITAL | Age: 64
Discharge: HOME | End: 2024-01-10
Attending: EMERGENCY MEDICINE | Admitting: EMERGENCY MEDICINE
Payer: MEDICAID

## 2024-01-10 ENCOUNTER — APPOINTMENT (OUTPATIENT)
Dept: GENERAL RADIOLOGY | Facility: HOSPITAL | Age: 64
End: 2024-01-10
Payer: MEDICAID

## 2024-01-10 VITALS
BODY MASS INDEX: 25.92 KG/M2 | HEIGHT: 69 IN | OXYGEN SATURATION: 95 % | HEART RATE: 98 BPM | DIASTOLIC BLOOD PRESSURE: 80 MMHG | RESPIRATION RATE: 18 BRPM | WEIGHT: 175 LBS | TEMPERATURE: 99.1 F | SYSTOLIC BLOOD PRESSURE: 116 MMHG

## 2024-01-10 DIAGNOSIS — J18.0 BRONCHIAL PNEUMONIA: Primary | ICD-10-CM

## 2024-01-10 LAB
ALBUMIN SERPL-MCNC: 3.5 G/DL (ref 3.5–5.2)
ALBUMIN/GLOB SERPL: 1.1 G/DL
ALP SERPL-CCNC: 70 U/L (ref 39–117)
ALT SERPL W P-5'-P-CCNC: 18 U/L (ref 1–41)
ANION GAP SERPL CALCULATED.3IONS-SCNC: 13.8 MMOL/L (ref 5–15)
AST SERPL-CCNC: 23 U/L (ref 1–40)
B PARAPERT DNA SPEC QL NAA+PROBE: NOT DETECTED
B PERT DNA SPEC QL NAA+PROBE: NOT DETECTED
BASOPHILS # BLD AUTO: 0 10*3/MM3 (ref 0–0.2)
BASOPHILS NFR BLD AUTO: 0 % (ref 0–1.5)
BILIRUB SERPL-MCNC: 1.1 MG/DL (ref 0–1.2)
BUN SERPL-MCNC: 12 MG/DL (ref 8–23)
BUN/CREAT SERPL: 11.1 (ref 7–25)
C PNEUM DNA NPH QL NAA+NON-PROBE: NOT DETECTED
CALCIUM SPEC-SCNC: 8.9 MG/DL (ref 8.6–10.5)
CHLORIDE SERPL-SCNC: 97 MMOL/L (ref 98–107)
CO2 SERPL-SCNC: 23.2 MMOL/L (ref 22–29)
CREAT SERPL-MCNC: 1.08 MG/DL (ref 0.76–1.27)
DEPRECATED RDW RBC AUTO: 38.9 FL (ref 37–54)
EGFRCR SERPLBLD CKD-EPI 2021: 77.6 ML/MIN/1.73
EOSINOPHIL # BLD AUTO: 0 10*3/MM3 (ref 0–0.4)
EOSINOPHIL NFR BLD AUTO: 0 % (ref 0.3–6.2)
ERYTHROCYTE [DISTWIDTH] IN BLOOD BY AUTOMATED COUNT: 12.4 % (ref 12.3–15.4)
FLUAV SUBTYP SPEC NAA+PROBE: NOT DETECTED
FLUBV RNA ISLT QL NAA+PROBE: NOT DETECTED
GEN 5 2HR TROPONIN T REFLEX: 92 NG/L
GLOBULIN UR ELPH-MCNC: 3.1 GM/DL
GLUCOSE SERPL-MCNC: 96 MG/DL (ref 65–99)
HADV DNA SPEC NAA+PROBE: NOT DETECTED
HCOV 229E RNA SPEC QL NAA+PROBE: NOT DETECTED
HCOV HKU1 RNA SPEC QL NAA+PROBE: NOT DETECTED
HCOV NL63 RNA SPEC QL NAA+PROBE: NOT DETECTED
HCOV OC43 RNA SPEC QL NAA+PROBE: NOT DETECTED
HCT VFR BLD AUTO: 34.4 % (ref 37.5–51)
HGB BLD-MCNC: 11.7 G/DL (ref 13–17.7)
HMPV RNA NPH QL NAA+NON-PROBE: NOT DETECTED
HPIV1 RNA ISLT QL NAA+PROBE: NOT DETECTED
HPIV2 RNA SPEC QL NAA+PROBE: NOT DETECTED
HPIV3 RNA NPH QL NAA+PROBE: NOT DETECTED
HPIV4 P GENE NPH QL NAA+PROBE: NOT DETECTED
IMM GRANULOCYTES # BLD AUTO: 0.03 10*3/MM3 (ref 0–0.05)
IMM GRANULOCYTES NFR BLD AUTO: 0.4 % (ref 0–0.5)
LYMPHOCYTES # BLD AUTO: 1.49 10*3/MM3 (ref 0.7–3.1)
LYMPHOCYTES NFR BLD AUTO: 22 % (ref 19.6–45.3)
M PNEUMO IGG SER IA-ACNC: NOT DETECTED
MCH RBC QN AUTO: 29.1 PG (ref 26.6–33)
MCHC RBC AUTO-ENTMCNC: 34 G/DL (ref 31.5–35.7)
MCV RBC AUTO: 85.6 FL (ref 79–97)
MONOCYTES # BLD AUTO: 0.58 10*3/MM3 (ref 0.1–0.9)
MONOCYTES NFR BLD AUTO: 8.6 % (ref 5–12)
NEUTROPHILS NFR BLD AUTO: 4.67 10*3/MM3 (ref 1.7–7)
NEUTROPHILS NFR BLD AUTO: 69 % (ref 42.7–76)
NRBC BLD AUTO-RTO: 0 /100 WBC (ref 0–0.2)
NT-PROBNP SERPL-MCNC: 1683 PG/ML (ref 0–900)
PLATELET # BLD AUTO: 269 10*3/MM3 (ref 140–450)
PMV BLD AUTO: 9.5 FL (ref 6–12)
POTASSIUM SERPL-SCNC: 3.5 MMOL/L (ref 3.5–5.2)
PROT SERPL-MCNC: 6.6 G/DL (ref 6–8.5)
QT INTERVAL: 343 MS
QTC INTERVAL: 475 MS
RBC # BLD AUTO: 4.02 10*6/MM3 (ref 4.14–5.8)
RHINOVIRUS RNA SPEC NAA+PROBE: NOT DETECTED
RSV RNA NPH QL NAA+NON-PROBE: NOT DETECTED
SARS-COV-2 RNA NPH QL NAA+NON-PROBE: NOT DETECTED
SODIUM SERPL-SCNC: 134 MMOL/L (ref 136–145)
TROPONIN T DELTA: -3 NG/L
TROPONIN T SERPL HS-MCNC: 95 NG/L
WBC NRBC COR # BLD AUTO: 6.77 10*3/MM3 (ref 3.4–10.8)

## 2024-01-10 PROCEDURE — 84484 ASSAY OF TROPONIN QUANT: CPT | Performed by: EMERGENCY MEDICINE

## 2024-01-10 PROCEDURE — 93010 ELECTROCARDIOGRAM REPORT: CPT | Performed by: STUDENT IN AN ORGANIZED HEALTH CARE EDUCATION/TRAINING PROGRAM

## 2024-01-10 PROCEDURE — 99284 EMERGENCY DEPT VISIT MOD MDM: CPT

## 2024-01-10 PROCEDURE — 0202U NFCT DS 22 TRGT SARS-COV-2: CPT | Performed by: EMERGENCY MEDICINE

## 2024-01-10 PROCEDURE — 93005 ELECTROCARDIOGRAM TRACING: CPT | Performed by: EMERGENCY MEDICINE

## 2024-01-10 PROCEDURE — 71045 X-RAY EXAM CHEST 1 VIEW: CPT

## 2024-01-10 PROCEDURE — 83880 ASSAY OF NATRIURETIC PEPTIDE: CPT | Performed by: EMERGENCY MEDICINE

## 2024-01-10 PROCEDURE — 80053 COMPREHEN METABOLIC PANEL: CPT | Performed by: EMERGENCY MEDICINE

## 2024-01-10 PROCEDURE — 85025 COMPLETE CBC W/AUTO DIFF WBC: CPT | Performed by: EMERGENCY MEDICINE

## 2024-01-10 RX ORDER — ALBUTEROL SULFATE 90 UG/1
2 AEROSOL, METERED RESPIRATORY (INHALATION) EVERY 4 HOURS PRN
Qty: 6.7 G | Refills: 0 | Status: SHIPPED | OUTPATIENT
Start: 2024-01-10

## 2024-01-10 RX ORDER — BENZONATATE 100 MG/1
100 CAPSULE ORAL 3 TIMES DAILY PRN
Qty: 15 CAPSULE | Refills: 0 | Status: SHIPPED | OUTPATIENT
Start: 2024-01-10

## 2024-01-10 RX ORDER — CEFDINIR 300 MG/1
300 CAPSULE ORAL 2 TIMES DAILY
Qty: 14 CAPSULE | Refills: 0 | Status: SHIPPED | OUTPATIENT
Start: 2024-01-10

## 2024-01-10 RX ORDER — METHYLPREDNISOLONE 4 MG/1
TABLET ORAL
Qty: 21 EACH | Refills: 0 | Status: SHIPPED | OUTPATIENT
Start: 2024-01-10

## 2024-01-10 RX ORDER — SODIUM CHLORIDE 0.9 % (FLUSH) 0.9 %
10 SYRINGE (ML) INJECTION AS NEEDED
Status: DISCONTINUED | OUTPATIENT
Start: 2024-01-10 | End: 2024-01-10 | Stop reason: HOSPADM

## 2024-01-10 NOTE — DISCHARGE INSTRUCTIONS
Take your medications as prescribed.  Rest for the next 24 hours.  Follow-up your doctor if not better in 1 week or return if worse

## 2024-01-10 NOTE — ED PROVIDER NOTES
EMERGENCY DEPARTMENT ENCOUNTER    Room Number:  25/25  Date seen:  1/10/2024  PCP: Alvaro Geronimo APRN  Historian: Patient      HPI:  Chief Complaint: Cough  Context: Jack Lopez is a 63 y.o. male who presents to the ED c/o a cough for the past 10 days.  The patient states he only notices a cough when he lies down at night but when he gets up the cough is resolved.  He denies sputum, chest pain, fevers or chills.  He denies lower extremity swelling or lower extremity edema.  The patient states he has had this once before and was diagnosed with bronchial pneumonia.  The patient was seen by his PCP this morning with a negative COVID and flu but was sent to the emergency room for an abnormal EKG.  Again the patient denies chest pain throughout this time.      PAST MEDICAL HISTORY  Active Ambulatory Problems     Diagnosis Date Noted    Paresthesia 01/26/2022    Hypokalemia 01/26/2022     Resolved Ambulatory Problems     Diagnosis Date Noted    TIA (transient ischemic attack) 01/26/2022     Past Medical History:   Diagnosis Date    Anxiety     Asthma     CAD (coronary artery disease)     Chronic systolic CHF (congestive heart failure)     Depression     Diabetes mellitus     Diastolic dysfunction     Hyperlipidemia     Hypertension     Ischemic cardiomyopathy     Mitral valve regurgitation     Myocardial infarction     NSTEMI (non-ST elevated myocardial infarction)     Prostatic hypertrophy     Seasonal allergies          REVIEW OF SYSTEMS  All systems reviewed and negative except for those discussed in HPI.       PAST SURGICAL HISTORY  Past Surgical History:   Procedure Laterality Date    CARDIAC SURGERY      CABG 4 vessel 2010         FAMILY HISTORY  Family History   Problem Relation Age of Onset    No Known Problems Mother     No Known Problems Father     No Known Problems Sister          SOCIAL HISTORY  Social History     Socioeconomic History    Marital status: Single   Tobacco Use    Smoking status: Never    Substance and Sexual Activity    Alcohol use: No    Drug use: No         ALLERGIES  Metformin      PHYSICAL EXAM  ED Triage Vitals   Temp Heart Rate Resp BP SpO2   01/10/24 1053 01/10/24 1053 01/10/24 1058 01/10/24 1058 01/10/24 1053   99.1 °F (37.3 °C) 109 20 129/82 95 %      Temp src Heart Rate Source Patient Position BP Location FiO2 (%)   01/10/24 1053 01/10/24 1053 01/10/24 1058 01/10/24 1058 --   Tympanic Monitor Lying Left arm        Physical Exam      GENERAL: 63-year-old male in no acute distress  HENT: NCAT: nares patent: Neck supple  EYES: no scleral icterus  CV: regular rhythm, normal rate  RESPIRATORY: normal effort  ABDOMEN: soft, NTND: Bowel sounds positive  MUSCULOSKELETAL: no deformity no pedal edema no calf tenderness  NEURO: alert with nonfocal neuro exam  PSYCH:  calm, cooperative  SKIN: warm, dry    Vital signs and nursing notes reviewed.      LAB RESULTS  Recent Results (from the past 24 hour(s))   ECG 12 Lead Rhythm Change    Collection Time: 01/10/24 10:58 AM   Result Value Ref Range    QT Interval 343 ms    QTC Interval 475 ms   Respiratory Panel PCR w/COVID-19(SARS-CoV-2) [facility]INDER/JEFF/PAD/[facility]/LEN In-House, NP Swab in UTM/VTM, 2 HR TAT - Swab, Nasopharynx    Collection Time: 01/10/24 11:51 AM    Specimen: Nasopharynx; Swab   Result Value Ref Range    ADENOVIRUS, PCR Not Detected Not Detected    Coronavirus 229E Not Detected Not Detected    Coronavirus HKU1 Not Detected Not Detected    Coronavirus NL63 Not Detected Not Detected    Coronavirus OC43 Not Detected Not Detected    COVID19 Not Detected Not Detected - Ref. Range    Human Metapneumovirus Not Detected Not Detected    Human Rhinovirus/Enterovirus Not Detected Not Detected    Influenza A PCR Not Detected Not Detected    Influenza B PCR Not Detected Not Detected    Parainfluenza Virus 1 Not Detected Not Detected    Parainfluenza Virus 2 Not Detected Not Detected    Parainfluenza Virus 3 Not Detected Not Detected    Parainfluenza Virus 4  Not Detected Not Detected    RSV, PCR Not Detected Not Detected    Bordetella pertussis pcr Not Detected Not Detected    Bordetella parapertussis PCR Not Detected Not Detected    Chlamydophila pneumoniae PCR Not Detected Not Detected    Mycoplasma pneumo by PCR Not Detected Not Detected   Comprehensive Metabolic Panel    Collection Time: 01/10/24 11:54 AM    Specimen: Blood   Result Value Ref Range    Glucose 96 65 - 99 mg/dL    BUN 12 8 - 23 mg/dL    Creatinine 1.08 0.76 - 1.27 mg/dL    Sodium 134 (L) 136 - 145 mmol/L    Potassium 3.5 3.5 - 5.2 mmol/L    Chloride 97 (L) 98 - 107 mmol/L    CO2 23.2 22.0 - 29.0 mmol/L    Calcium 8.9 8.6 - 10.5 mg/dL    Total Protein 6.6 6.0 - 8.5 g/dL    Albumin 3.5 3.5 - 5.2 g/dL    ALT (SGPT) 18 1 - 41 U/L    AST (SGOT) 23 1 - 40 U/L    Alkaline Phosphatase 70 39 - 117 U/L    Total Bilirubin 1.1 0.0 - 1.2 mg/dL    Globulin 3.1 gm/dL    A/G Ratio 1.1 g/dL    BUN/Creatinine Ratio 11.1 7.0 - 25.0    Anion Gap 13.8 5.0 - 15.0 mmol/L    eGFR 77.6 >60.0 mL/min/1.73   BNP    Collection Time: 01/10/24 11:54 AM    Specimen: Blood   Result Value Ref Range    proBNP 1,683.0 (H) 0.0 - 900.0 pg/mL   Single High Sensitivity Troponin T    Collection Time: 01/10/24 11:54 AM    Specimen: Blood   Result Value Ref Range    HS Troponin T 95 (C) <22 ng/L   CBC Auto Differential    Collection Time: 01/10/24 11:54 AM    Specimen: Blood   Result Value Ref Range    WBC 6.77 3.40 - 10.80 10*3/mm3    RBC 4.02 (L) 4.14 - 5.80 10*6/mm3    Hemoglobin 11.7 (L) 13.0 - 17.7 g/dL    Hematocrit 34.4 (L) 37.5 - 51.0 %    MCV 85.6 79.0 - 97.0 fL    MCH 29.1 26.6 - 33.0 pg    MCHC 34.0 31.5 - 35.7 g/dL    RDW 12.4 12.3 - 15.4 %    RDW-SD 38.9 37.0 - 54.0 fl    MPV 9.5 6.0 - 12.0 fL    Platelets 269 140 - 450 10*3/mm3    Neutrophil % 69.0 42.7 - 76.0 %    Lymphocyte % 22.0 19.6 - 45.3 %    Monocyte % 8.6 5.0 - 12.0 %    Eosinophil % 0.0 (L) 0.3 - 6.2 %    Basophil % 0.0 0.0 - 1.5 %    Immature Grans % 0.4 0.0 - 0.5 %     Neutrophils, Absolute 4.67 1.70 - 7.00 10*3/mm3    Lymphocytes, Absolute 1.49 0.70 - 3.10 10*3/mm3    Monocytes, Absolute 0.58 0.10 - 0.90 10*3/mm3    Eosinophils, Absolute 0.00 0.00 - 0.40 10*3/mm3    Basophils, Absolute 0.00 0.00 - 0.20 10*3/mm3    Immature Grans, Absolute 0.03 0.00 - 0.05 10*3/mm3    nRBC 0.0 0.0 - 0.2 /100 WBC   High Sensitivity Troponin T 2Hr    Collection Time: 01/10/24  1:52 PM    Specimen: Blood   Result Value Ref Range    HS Troponin T 92 (C) <22 ng/L    Troponin T Delta -3 >=-4 - <+4 ng/L       Ordered the above labs and reviewed the results.        RADIOLOGY  XR Chest 1 View    Result Date: 1/10/2024  XR CHEST 1 VW-  HISTORY: Male who is 63 years-old, cough  TECHNIQUE: Frontal view of the chest  COMPARISON: 5/15/2023  FINDINGS: The heart size is borderline. Sternotomy wires are noted. Pulmonary vasculature is unremarkable. Patchy bilateral infiltrates are apparent predominantly in the lower lungs, follow-up advised. No pleural effusion, or pneumothorax. No acute osseous process.      As described.  This report was finalized on 1/10/2024 12:27 PM by Dr. Damien Suárez M.D on Workstation: [id number]       Ordered the above noted radiological studies. Reviewed by me in PACS.            PROCEDURES  Procedures          MEDICATIONS GIVEN IN ER  Medications   sodium chloride 0.9 % flush 10 mL (has no administration in time range)             MEDICAL DECISION MAKING, PROGRESS, and CONSULTS    All labs have been independently reviewed by me.  All radiology studies have been reviewed by me and I have also reviewed the radiology report.   EKG's independently viewed and interpreted by me.  Discussion below represents my analysis of pertinent findings related to patient's condition, differential diagnosis, treatment plan and final disposition.      Additional sources:    - External (non-ED) record review: I reviewed the patient's urgent care note from today where his COVID and flu were negative and  his oxygen saturations were 93%.  They felt his EKG was abnormal but did not have an old one to compare to and thus sent him to the ER for evaluation.    - Chronic or social conditions impacting care: Patient lives at home    - Shared decision making: After shared decision-making discussion we agree he is stable for discharge and outpatient follow-up.  I gave him careful return to the emergency room instructions.      Orders placed during this visit:  Orders Placed This Encounter   Procedures    Respiratory Panel PCR w/COVID-19(SARS-CoV-2) [facility]INDER/JEFF/PAD/[facility]/LEN In-House, NP Swab in UTM/VTM, 2 HR TAT - Swab, Nasopharynx    XR Chest 1 View    Comprehensive Metabolic Panel    BNP    Single High Sensitivity Troponin T    CBC Auto Differential    High Sensitivity Troponin T 2Hr    Monitor Blood Pressure    Pulse Oximetry, Continuous    ECG 12 Lead Rhythm Change    Insert Peripheral IV    CBC & Differential         Differential diagnosis:  My differential diagnosis includes but is not limited to pneumonia, congestive heart failure, pulmonary embolism, pleural effusion, acute coronary syndrome, chronic obstructive pulmonary disease exacerbation, or pneumothorax.      Independent interpretation of labs, radiology studies, and discussions with consultants:  ED Course as of 01/10/24 1432   Wed Zaki 10, 2024   1219 EKG    EKG time: 1058  Rhythm/Rate: Sinus tachycardia at 115  IVCD  No Acute Ischemia  Non-Specific ST-T changes    Similar compared to prior on 5/15/2023    Interpreted Contemporaneously by me.  Independently viewed by me     [GP]   1237 My independent interpretation of the patient's chest x-ray is sternotomy wires in place.  Patchy bilateral infiltrates consistent with viral pneumonia versus scarring.   [GP]   1239 The patient's initial troponin is elevated at 95.  The patient denies chest pain and his EKG shows no acute ischemic changes.  Will repeat his troponin.  The patient's BNP is elevated at 1683.  His  chest x-ray is read as bilateral infiltrates.  Patient states he has some chronic lung scarring that is normally seen on his chest x-ray [GP]   1407 The patient's RVP is negative [GP]   1421 The patient's repeat troponin is 92 which gives him a delta of -3. [GP]   1427 On repeat evaluation the patient was resting comfortably with room air saturations are 96%.  I advised him of his workup with his unremarkable EKG and elevated troponins but negative delta.  The patient states this is exactly like what he had with his previous bronchial pneumonia.  He states he has had 2 heart attacks and with his heart attacks he gets jaw pain and left arm pain.  He states he has had no anginal symptoms.  The patient states he would like to go home with antibiotics and cough medicine and will follow-up with his doctor next week or return if worse. [GP]      ED Course User Index  [GP] Canelo Yin MD               DIAGNOSIS  Final diagnoses:   Bronchial pneumonia         DISPOSITION  DISCHARGE    Patient discharged in stable condition.    Reviewed implications of results, diagnosis, meds, responsibility to follow up, warning signs and symptoms of possible worsening, potential complications and reasons to return to ER, including chest pain, worsening shortness of breath or swelling.    Patient/Family voiced understanding of above instructions.    Discussed plan for discharge, as there is no emergent indication for admission.  Pt/family is agreeable and understands need for follow up and repeat testing.  Pt is aware that discharge does not mean that nothing is wrong but it indicates no emergency is present and they must continue care with follow-up as given below or physician of their choice.     FOLLOW-UP  Avlaro Geronimo, APRN  [address] [id number] [phone]    In 1 week  If symptoms worsen                Latest Documented Vital Signs:  As of 14:32 EST  BP- 109/67 HR- 95 Temp- 99.1 °F (37.3  °C) (Tympanic) O2 sat- 94%--      --------------------  Please note that portions of this were completed with a voice recognition program.       Note Disclaimer: At [facility], we believe that sharing information builds trust and better relationships. You are receiving this note because you are receiving care at [facility] or recently visited. It is possible you will see health information before a provider has talked with you about it. This kind of information can be easy to misunderstand. To help you fully understand what it means for your health, we urge you to discuss this note with your provider.             Canelo Yin MD  01/10/24 2745

## 2024-02-13 ENCOUNTER — APPOINTMENT (OUTPATIENT)
Dept: GENERAL RADIOLOGY | Facility: HOSPITAL | Age: 64
End: 2024-02-13
Payer: MEDICAID

## 2024-02-13 ENCOUNTER — HOSPITAL ENCOUNTER (EMERGENCY)
Facility: HOSPITAL | Age: 64
Discharge: LEFT BEFORE DISCHARGE | End: 2024-02-13
Attending: EMERGENCY MEDICINE | Admitting: EMERGENCY MEDICINE
Payer: MEDICAID

## 2024-02-13 ENCOUNTER — APPOINTMENT (OUTPATIENT)
Dept: CT IMAGING | Facility: HOSPITAL | Age: 64
End: 2024-02-13
Payer: MEDICAID

## 2024-02-13 VITALS
HEIGHT: 69 IN | DIASTOLIC BLOOD PRESSURE: 78 MMHG | SYSTOLIC BLOOD PRESSURE: 119 MMHG | OXYGEN SATURATION: 98 % | WEIGHT: 175 LBS | RESPIRATION RATE: 20 BRPM | HEART RATE: 118 BPM | TEMPERATURE: 98.7 F | BODY MASS INDEX: 25.92 KG/M2

## 2024-02-13 DIAGNOSIS — R33.8 ACUTE URINARY RETENTION: ICD-10-CM

## 2024-02-13 DIAGNOSIS — R06.00 DYSPNEA, UNSPECIFIED TYPE: ICD-10-CM

## 2024-02-13 DIAGNOSIS — I50.9 ACUTE ON CHRONIC CONGESTIVE HEART FAILURE, UNSPECIFIED HEART FAILURE TYPE: Primary | ICD-10-CM

## 2024-02-13 PROBLEM — E11.9 DM (DIABETES MELLITUS), TYPE 2: Status: ACTIVE | Noted: 2024-02-13

## 2024-02-13 PROBLEM — E78.5 HYPERLIPEMIA: Status: ACTIVE | Noted: 2024-02-13

## 2024-02-13 PROBLEM — I25.10 CAD (CORONARY ARTERY DISEASE): Status: ACTIVE | Noted: 2024-02-13

## 2024-02-13 PROBLEM — Z87.01 HISTORY OF PNEUMONIA: Status: ACTIVE | Noted: 2024-02-13

## 2024-02-13 PROBLEM — I50.43 ACUTE ON CHRONIC COMBINED SYSTOLIC AND DIASTOLIC CHF (CONGESTIVE HEART FAILURE): Status: ACTIVE | Noted: 2024-02-13

## 2024-02-13 PROBLEM — J18.9 PNEUMONIA: Status: ACTIVE | Noted: 2024-02-13

## 2024-02-13 PROBLEM — R05.9 COUGH: Status: ACTIVE | Noted: 2024-02-13

## 2024-02-13 PROBLEM — E11.9 DM (DIABETES MELLITUS), TYPE 2: Chronic | Status: ACTIVE | Noted: 2024-02-13

## 2024-02-13 LAB
ALBUMIN SERPL-MCNC: 4.3 G/DL (ref 3.5–5.2)
ALBUMIN/GLOB SERPL: 1.6 G/DL
ALP SERPL-CCNC: 75 U/L (ref 39–117)
ALT SERPL W P-5'-P-CCNC: 13 U/L (ref 1–41)
ANION GAP SERPL CALCULATED.3IONS-SCNC: 12 MMOL/L (ref 5–15)
AST SERPL-CCNC: 18 U/L (ref 1–40)
B PARAPERT DNA SPEC QL NAA+PROBE: NOT DETECTED
B PERT DNA SPEC QL NAA+PROBE: NOT DETECTED
BACTERIA UR QL AUTO: ABNORMAL /HPF
BASOPHILS # BLD AUTO: 0 10*3/MM3 (ref 0–0.2)
BASOPHILS NFR BLD AUTO: 0 % (ref 0–1.5)
BILIRUB SERPL-MCNC: 1.4 MG/DL (ref 0–1.2)
BILIRUB UR QL STRIP: NEGATIVE
BUN SERPL-MCNC: 10 MG/DL (ref 8–23)
BUN/CREAT SERPL: 7.4 (ref 7–25)
C PNEUM DNA NPH QL NAA+NON-PROBE: NOT DETECTED
CALCIUM SPEC-SCNC: 9.2 MG/DL (ref 8.6–10.5)
CHLORIDE SERPL-SCNC: 100 MMOL/L (ref 98–107)
CLARITY UR: CLEAR
CO2 SERPL-SCNC: 24 MMOL/L (ref 22–29)
COLOR UR: ABNORMAL
CREAT SERPL-MCNC: 1.35 MG/DL (ref 0.76–1.27)
D-LACTATE SERPL-SCNC: 2.5 MMOL/L (ref 0.5–2)
DEPRECATED RDW RBC AUTO: 43.2 FL (ref 37–54)
EGFRCR SERPLBLD CKD-EPI 2021: 59.4 ML/MIN/1.73
EOSINOPHIL # BLD AUTO: 0 10*3/MM3 (ref 0–0.4)
EOSINOPHIL NFR BLD AUTO: 0 % (ref 0.3–6.2)
ERYTHROCYTE [DISTWIDTH] IN BLOOD BY AUTOMATED COUNT: 13.8 % (ref 12.3–15.4)
FLUAV SUBTYP SPEC NAA+PROBE: NOT DETECTED
FLUBV RNA ISLT QL NAA+PROBE: NOT DETECTED
GEN 5 2HR TROPONIN T REFLEX: 68 NG/L
GLOBULIN UR ELPH-MCNC: 2.7 GM/DL
GLUCOSE SERPL-MCNC: 152 MG/DL (ref 65–99)
GLUCOSE UR STRIP-MCNC: NEGATIVE MG/DL
HADV DNA SPEC NAA+PROBE: NOT DETECTED
HBA1C MFR BLD: 5.7 % (ref 4.8–5.6)
HCOV 229E RNA SPEC QL NAA+PROBE: NOT DETECTED
HCOV HKU1 RNA SPEC QL NAA+PROBE: NOT DETECTED
HCOV NL63 RNA SPEC QL NAA+PROBE: NOT DETECTED
HCOV OC43 RNA SPEC QL NAA+PROBE: NOT DETECTED
HCT VFR BLD AUTO: 36.8 % (ref 37.5–51)
HGB BLD-MCNC: 12.2 G/DL (ref 13–17.7)
HGB UR QL STRIP.AUTO: NEGATIVE
HMPV RNA NPH QL NAA+NON-PROBE: NOT DETECTED
HOLD SPECIMEN: NORMAL
HOLD SPECIMEN: NORMAL
HPIV1 RNA ISLT QL NAA+PROBE: NOT DETECTED
HPIV2 RNA SPEC QL NAA+PROBE: NOT DETECTED
HPIV3 RNA NPH QL NAA+PROBE: NOT DETECTED
HPIV4 P GENE NPH QL NAA+PROBE: NOT DETECTED
HYALINE CASTS UR QL AUTO: ABNORMAL /LPF
IMM GRANULOCYTES # BLD AUTO: 0.05 10*3/MM3 (ref 0–0.05)
IMM GRANULOCYTES NFR BLD AUTO: 0.6 % (ref 0–0.5)
KETONES UR QL STRIP: ABNORMAL
LEUKOCYTE ESTERASE UR QL STRIP.AUTO: ABNORMAL
LYMPHOCYTES # BLD AUTO: 1.38 10*3/MM3 (ref 0.7–3.1)
LYMPHOCYTES NFR BLD AUTO: 16.3 % (ref 19.6–45.3)
M PNEUMO IGG SER IA-ACNC: NOT DETECTED
MCH RBC QN AUTO: 28.9 PG (ref 26.6–33)
MCHC RBC AUTO-ENTMCNC: 33.2 G/DL (ref 31.5–35.7)
MCV RBC AUTO: 87.2 FL (ref 79–97)
MONOCYTES # BLD AUTO: 0.62 10*3/MM3 (ref 0.1–0.9)
MONOCYTES NFR BLD AUTO: 7.3 % (ref 5–12)
NEUTROPHILS NFR BLD AUTO: 6.4 10*3/MM3 (ref 1.7–7)
NEUTROPHILS NFR BLD AUTO: 75.8 % (ref 42.7–76)
NITRITE UR QL STRIP: NEGATIVE
NRBC BLD AUTO-RTO: 0 /100 WBC (ref 0–0.2)
NT-PROBNP SERPL-MCNC: 2620 PG/ML (ref 0–900)
PH UR STRIP.AUTO: 5.5 [PH] (ref 5–8)
PLATELET # BLD AUTO: 185 10*3/MM3 (ref 140–450)
PMV BLD AUTO: 10.5 FL (ref 6–12)
POTASSIUM SERPL-SCNC: 3.6 MMOL/L (ref 3.5–5.2)
PROCALCITONIN SERPL-MCNC: 0.09 NG/ML (ref 0–0.25)
PROT SERPL-MCNC: 7 G/DL (ref 6–8.5)
PROT UR QL STRIP: ABNORMAL
QT INTERVAL: 313 MS
QTC INTERVAL: 435 MS
RBC # BLD AUTO: 4.22 10*6/MM3 (ref 4.14–5.8)
RBC # UR STRIP: ABNORMAL /HPF
REF LAB TEST METHOD: ABNORMAL
RHINOVIRUS RNA SPEC NAA+PROBE: NOT DETECTED
RSV RNA NPH QL NAA+NON-PROBE: NOT DETECTED
SARS-COV-2 RNA NPH QL NAA+NON-PROBE: NOT DETECTED
SODIUM SERPL-SCNC: 136 MMOL/L (ref 136–145)
SP GR UR STRIP: 1.02 (ref 1–1.03)
SQUAMOUS #/AREA URNS HPF: ABNORMAL /HPF
TROPONIN T DELTA: -11 NG/L
TROPONIN T SERPL HS-MCNC: 79 NG/L
UROBILINOGEN UR QL STRIP: ABNORMAL
WBC # UR STRIP: ABNORMAL /HPF
WBC NRBC COR # BLD AUTO: 8.45 10*3/MM3 (ref 3.4–10.8)
WHOLE BLOOD HOLD COAG: NORMAL
WHOLE BLOOD HOLD SPECIMEN: NORMAL

## 2024-02-13 PROCEDURE — 80053 COMPREHEN METABOLIC PANEL: CPT | Performed by: EMERGENCY MEDICINE

## 2024-02-13 PROCEDURE — 99291 CRITICAL CARE FIRST HOUR: CPT

## 2024-02-13 PROCEDURE — 25010000002 FUROSEMIDE PER 20 MG: Performed by: EMERGENCY MEDICINE

## 2024-02-13 PROCEDURE — 71045 X-RAY EXAM CHEST 1 VIEW: CPT

## 2024-02-13 PROCEDURE — 96374 THER/PROPH/DIAG INJ IV PUSH: CPT

## 2024-02-13 PROCEDURE — 83605 ASSAY OF LACTIC ACID: CPT | Performed by: EMERGENCY MEDICINE

## 2024-02-13 PROCEDURE — 85025 COMPLETE CBC W/AUTO DIFF WBC: CPT | Performed by: EMERGENCY MEDICINE

## 2024-02-13 PROCEDURE — 71275 CT ANGIOGRAPHY CHEST: CPT

## 2024-02-13 PROCEDURE — 81001 URINALYSIS AUTO W/SCOPE: CPT | Performed by: EMERGENCY MEDICINE

## 2024-02-13 PROCEDURE — 36415 COLL VENOUS BLD VENIPUNCTURE: CPT

## 2024-02-13 PROCEDURE — 25510000001 IOPAMIDOL PER 1 ML: Performed by: EMERGENCY MEDICINE

## 2024-02-13 PROCEDURE — 94799 UNLISTED PULMONARY SVC/PX: CPT

## 2024-02-13 PROCEDURE — 94761 N-INVAS EAR/PLS OXIMETRY MLT: CPT

## 2024-02-13 PROCEDURE — 84484 ASSAY OF TROPONIN QUANT: CPT | Performed by: EMERGENCY MEDICINE

## 2024-02-13 PROCEDURE — 51798 US URINE CAPACITY MEASURE: CPT

## 2024-02-13 PROCEDURE — 93005 ELECTROCARDIOGRAM TRACING: CPT

## 2024-02-13 PROCEDURE — 25810000003 SODIUM CHLORIDE 0.9 % SOLUTION: Performed by: EMERGENCY MEDICINE

## 2024-02-13 PROCEDURE — 0202U NFCT DS 22 TRGT SARS-COV-2: CPT | Performed by: EMERGENCY MEDICINE

## 2024-02-13 PROCEDURE — 84145 PROCALCITONIN (PCT): CPT | Performed by: EMERGENCY MEDICINE

## 2024-02-13 PROCEDURE — 83880 ASSAY OF NATRIURETIC PEPTIDE: CPT | Performed by: EMERGENCY MEDICINE

## 2024-02-13 PROCEDURE — 96375 TX/PRO/DX INJ NEW DRUG ADDON: CPT

## 2024-02-13 PROCEDURE — 94664 DEMO&/EVAL PT USE INHALER: CPT

## 2024-02-13 PROCEDURE — G0378 HOSPITAL OBSERVATION PER HR: HCPCS

## 2024-02-13 PROCEDURE — 94640 AIRWAY INHALATION TREATMENT: CPT

## 2024-02-13 PROCEDURE — 93005 ELECTROCARDIOGRAM TRACING: CPT | Performed by: EMERGENCY MEDICINE

## 2024-02-13 PROCEDURE — 83036 HEMOGLOBIN GLYCOSYLATED A1C: CPT | Performed by: HOSPITALIST

## 2024-02-13 PROCEDURE — 25010000002 MORPHINE PER 10 MG: Performed by: EMERGENCY MEDICINE

## 2024-02-13 RX ORDER — BISACODYL 5 MG/1
5 TABLET, DELAYED RELEASE ORAL DAILY PRN
Status: DISCONTINUED | OUTPATIENT
Start: 2024-02-13 | End: 2024-02-13 | Stop reason: HOSPADM

## 2024-02-13 RX ORDER — BENZONATATE 100 MG/1
100 CAPSULE ORAL 3 TIMES DAILY PRN
Status: DISCONTINUED | OUTPATIENT
Start: 2024-02-13 | End: 2024-02-13 | Stop reason: HOSPADM

## 2024-02-13 RX ORDER — DEXTROSE MONOHYDRATE 25 G/50ML
25 INJECTION, SOLUTION INTRAVENOUS
Status: DISCONTINUED | OUTPATIENT
Start: 2024-02-13 | End: 2024-02-13 | Stop reason: HOSPADM

## 2024-02-13 RX ORDER — SODIUM CHLORIDE 9 MG/ML
40 INJECTION, SOLUTION INTRAVENOUS AS NEEDED
Status: DISCONTINUED | OUTPATIENT
Start: 2024-02-13 | End: 2024-02-13 | Stop reason: HOSPADM

## 2024-02-13 RX ORDER — BISACODYL 10 MG
10 SUPPOSITORY, RECTAL RECTAL DAILY PRN
Status: DISCONTINUED | OUTPATIENT
Start: 2024-02-13 | End: 2024-02-13 | Stop reason: HOSPADM

## 2024-02-13 RX ORDER — SODIUM CHLORIDE 0.9 % (FLUSH) 0.9 %
10 SYRINGE (ML) INJECTION EVERY 12 HOURS SCHEDULED
Status: DISCONTINUED | OUTPATIENT
Start: 2024-02-13 | End: 2024-02-13 | Stop reason: HOSPADM

## 2024-02-13 RX ORDER — IBUPROFEN 600 MG/1
1 TABLET ORAL
Status: DISCONTINUED | OUTPATIENT
Start: 2024-02-13 | End: 2024-02-13 | Stop reason: HOSPADM

## 2024-02-13 RX ORDER — SODIUM CHLORIDE 0.9 % (FLUSH) 0.9 %
10 SYRINGE (ML) INJECTION AS NEEDED
Status: DISCONTINUED | OUTPATIENT
Start: 2024-02-13 | End: 2024-02-13 | Stop reason: HOSPADM

## 2024-02-13 RX ORDER — TAMSULOSIN HYDROCHLORIDE 0.4 MG/1
0.4 CAPSULE ORAL DAILY
Status: DISCONTINUED | OUTPATIENT
Start: 2024-02-13 | End: 2024-02-13 | Stop reason: HOSPADM

## 2024-02-13 RX ORDER — INSULIN LISPRO 100 [IU]/ML
2-7 INJECTION, SOLUTION INTRAVENOUS; SUBCUTANEOUS
Status: DISCONTINUED | OUTPATIENT
Start: 2024-02-13 | End: 2024-02-13 | Stop reason: HOSPADM

## 2024-02-13 RX ORDER — POLYETHYLENE GLYCOL 3350 17 G/17G
17 POWDER, FOR SOLUTION ORAL DAILY PRN
Status: DISCONTINUED | OUTPATIENT
Start: 2024-02-13 | End: 2024-02-13 | Stop reason: HOSPADM

## 2024-02-13 RX ORDER — AMOXICILLIN 250 MG
2 CAPSULE ORAL 2 TIMES DAILY
Status: DISCONTINUED | OUTPATIENT
Start: 2024-02-13 | End: 2024-02-13 | Stop reason: HOSPADM

## 2024-02-13 RX ORDER — NICOTINE POLACRILEX 4 MG
15 LOZENGE BUCCAL
Status: DISCONTINUED | OUTPATIENT
Start: 2024-02-13 | End: 2024-02-13 | Stop reason: HOSPADM

## 2024-02-13 RX ORDER — MORPHINE SULFATE 2 MG/ML
2 INJECTION, SOLUTION INTRAMUSCULAR; INTRAVENOUS ONCE
Status: COMPLETED | OUTPATIENT
Start: 2024-02-13 | End: 2024-02-13

## 2024-02-13 RX ORDER — ALBUTEROL SULFATE 2.5 MG/3ML
2.5 SOLUTION RESPIRATORY (INHALATION)
Status: COMPLETED | OUTPATIENT
Start: 2024-02-13 | End: 2024-02-13

## 2024-02-13 RX ORDER — ALBUTEROL SULFATE 90 UG/1
2 AEROSOL, METERED RESPIRATORY (INHALATION) EVERY 4 HOURS PRN
Status: DISCONTINUED | OUTPATIENT
Start: 2024-02-13 | End: 2024-02-13 | Stop reason: HOSPADM

## 2024-02-13 RX ORDER — ONDANSETRON 2 MG/ML
4 INJECTION INTRAMUSCULAR; INTRAVENOUS EVERY 6 HOURS PRN
Status: DISCONTINUED | OUTPATIENT
Start: 2024-02-13 | End: 2024-02-13 | Stop reason: HOSPADM

## 2024-02-13 RX ORDER — FUROSEMIDE 10 MG/ML
80 INJECTION INTRAMUSCULAR; INTRAVENOUS ONCE
Status: COMPLETED | OUTPATIENT
Start: 2024-02-13 | End: 2024-02-13

## 2024-02-13 RX ORDER — ALPRAZOLAM 1 MG/1
2 TABLET ORAL NIGHTLY
Status: DISCONTINUED | OUTPATIENT
Start: 2024-02-13 | End: 2024-02-13 | Stop reason: HOSPADM

## 2024-02-13 RX ORDER — ASPIRIN 81 MG/1
81 TABLET ORAL DAILY
Status: DISCONTINUED | OUTPATIENT
Start: 2024-02-13 | End: 2024-02-13 | Stop reason: HOSPADM

## 2024-02-13 RX ADMIN — ALBUTEROL SULFATE 2.5 MG: 2.5 SOLUTION RESPIRATORY (INHALATION) at 09:39

## 2024-02-13 RX ADMIN — IOPAMIDOL 95 ML: 755 INJECTION, SOLUTION INTRAVENOUS at 10:13

## 2024-02-13 RX ADMIN — SODIUM CHLORIDE 1000 ML: 9 INJECTION, SOLUTION INTRAVENOUS at 10:01

## 2024-02-13 RX ADMIN — FUROSEMIDE 80 MG: 10 INJECTION, SOLUTION INTRAMUSCULAR; INTRAVENOUS at 11:15

## 2024-02-13 RX ADMIN — ALBUTEROL SULFATE 2.5 MG: 2.5 SOLUTION RESPIRATORY (INHALATION) at 09:47

## 2024-02-13 RX ADMIN — ALBUTEROL SULFATE 2.5 MG: 2.5 SOLUTION RESPIRATORY (INHALATION) at 09:42

## 2024-02-13 RX ADMIN — MORPHINE SULFATE 2 MG: 2 INJECTION, SOLUTION INTRAMUSCULAR; INTRAVENOUS at 10:01

## 2024-06-30 ENCOUNTER — APPOINTMENT (OUTPATIENT)
Dept: CT IMAGING | Facility: HOSPITAL | Age: 64
End: 2024-06-30
Payer: MEDICARE

## 2024-06-30 ENCOUNTER — APPOINTMENT (OUTPATIENT)
Dept: GENERAL RADIOLOGY | Facility: HOSPITAL | Age: 64
End: 2024-06-30
Payer: MEDICARE

## 2024-06-30 ENCOUNTER — HOSPITAL ENCOUNTER (INPATIENT)
Facility: HOSPITAL | Age: 64
LOS: 1 days | Discharge: LEFT BEFORE DISCHARGE | End: 2024-06-30
Attending: EMERGENCY MEDICINE | Admitting: HOSPITALIST
Payer: MEDICARE

## 2024-06-30 VITALS
OXYGEN SATURATION: 99 % | TEMPERATURE: 97.3 F | RESPIRATION RATE: 16 BRPM | HEART RATE: 119 BPM | DIASTOLIC BLOOD PRESSURE: 89 MMHG | BODY MASS INDEX: 24.48 KG/M2 | WEIGHT: 165.3 LBS | HEIGHT: 69 IN | SYSTOLIC BLOOD PRESSURE: 102 MMHG

## 2024-06-30 DIAGNOSIS — R79.89 TROPONIN LEVEL ELEVATED: ICD-10-CM

## 2024-06-30 DIAGNOSIS — I25.5 ISCHEMIC CARDIOMYOPATHY: ICD-10-CM

## 2024-06-30 DIAGNOSIS — I50.9 ACUTE ON CHRONIC CONGESTIVE HEART FAILURE, UNSPECIFIED HEART FAILURE TYPE: Primary | ICD-10-CM

## 2024-06-30 DIAGNOSIS — Z91.199 HISTORY OF NONCOMPLIANCE WITH MEDICAL TREATMENT, PRESENTING HAZARDS TO HEALTH: ICD-10-CM

## 2024-06-30 LAB
ALBUMIN SERPL-MCNC: 3.8 G/DL (ref 3.5–5.2)
ALBUMIN/GLOB SERPL: 1.5 G/DL
ALP SERPL-CCNC: 74 U/L (ref 39–117)
ALT SERPL W P-5'-P-CCNC: 232 U/L (ref 1–41)
AMPHET+METHAMPHET UR QL: POSITIVE
ANION GAP SERPL CALCULATED.3IONS-SCNC: 14.4 MMOL/L (ref 5–15)
ARTERIAL PATENCY WRIST A: POSITIVE
AST SERPL-CCNC: 145 U/L (ref 1–40)
ATMOSPHERIC PRESS: 750.7 MMHG
BACTERIA UR QL AUTO: NORMAL /HPF
BARBITURATES UR QL SCN: NEGATIVE
BASE EXCESS BLDA CALC-SCNC: -4.4 MMOL/L (ref 0–2)
BASOPHILS # BLD AUTO: 0 10*3/MM3 (ref 0–0.2)
BASOPHILS NFR BLD AUTO: 0 % (ref 0–1.5)
BDY SITE: ABNORMAL
BENZODIAZ UR QL SCN: POSITIVE
BILIRUB SERPL-MCNC: 1.6 MG/DL (ref 0–1.2)
BILIRUB UR QL STRIP: NEGATIVE
BUN SERPL-MCNC: 23 MG/DL (ref 8–23)
BUN/CREAT SERPL: 17.4 (ref 7–25)
CALCIUM SPEC-SCNC: 8.8 MG/DL (ref 8.6–10.5)
CANNABINOIDS SERPL QL: NEGATIVE
CHLORIDE SERPL-SCNC: 103 MMOL/L (ref 98–107)
CLARITY UR: ABNORMAL
CO2 BLDA-SCNC: 19.7 MMOL/L (ref 23–27)
CO2 SERPL-SCNC: 16.6 MMOL/L (ref 22–29)
COCAINE UR QL: NEGATIVE
COLOR UR: ABNORMAL
CREAT SERPL-MCNC: 1.32 MG/DL (ref 0.76–1.27)
DEPRECATED RDW RBC AUTO: 53 FL (ref 37–54)
DIGOXIN SERPL-MCNC: <0.3 NG/ML (ref 0.6–1.2)
EGFRCR SERPLBLD CKD-EPI 2021: 61 ML/MIN/1.73
EOSINOPHIL # BLD AUTO: 0 10*3/MM3 (ref 0–0.4)
EOSINOPHIL NFR BLD AUTO: 0 % (ref 0.3–6.2)
ERYTHROCYTE [DISTWIDTH] IN BLOOD BY AUTOMATED COUNT: 15.3 % (ref 12.3–15.4)
ETHANOL BLD-MCNC: <10 MG/DL (ref 0–10)
ETHANOL UR QL: <0.01 %
FENTANYL UR-MCNC: NEGATIVE NG/ML
GAS FLOW AIRWAY: 2 LPM
GEN 5 2HR TROPONIN T REFLEX: 81 NG/L
GLOBULIN UR ELPH-MCNC: 2.5 GM/DL
GLUCOSE BLDC GLUCOMTR-MCNC: 122 MG/DL (ref 70–130)
GLUCOSE BLDC GLUCOMTR-MCNC: 154 MG/DL (ref 70–130)
GLUCOSE SERPL-MCNC: 170 MG/DL (ref 65–99)
GLUCOSE UR STRIP-MCNC: NEGATIVE MG/DL
HBA1C MFR BLD: 6.3 % (ref 4.8–5.6)
HCO3 BLDA-SCNC: 18.8 MMOL/L (ref 22–28)
HCT VFR BLD AUTO: 40.6 % (ref 37.5–51)
HEMODILUTION: NO
HGB BLD-MCNC: 13 G/DL (ref 13–17.7)
HGB UR QL STRIP.AUTO: NEGATIVE
HYALINE CASTS UR QL AUTO: NORMAL /LPF
IMM GRANULOCYTES # BLD AUTO: 0.03 10*3/MM3 (ref 0–0.05)
IMM GRANULOCYTES NFR BLD AUTO: 0.5 % (ref 0–0.5)
INR PPP: 1.68 (ref 0.9–1.1)
KETONES UR QL STRIP: NEGATIVE
LEUKOCYTE ESTERASE UR QL STRIP.AUTO: ABNORMAL
LYMPHOCYTES # BLD AUTO: 0.7 10*3/MM3 (ref 0.7–3.1)
LYMPHOCYTES NFR BLD AUTO: 12.1 % (ref 19.6–45.3)
MAGNESIUM SERPL-MCNC: 1.6 MG/DL (ref 1.6–2.4)
MCH RBC QN AUTO: 30.1 PG (ref 26.6–33)
MCHC RBC AUTO-ENTMCNC: 32 G/DL (ref 31.5–35.7)
MCV RBC AUTO: 94 FL (ref 79–97)
METHADONE UR QL SCN: NEGATIVE
MODALITY: ABNORMAL
MONOCYTES # BLD AUTO: 0.38 10*3/MM3 (ref 0.1–0.9)
MONOCYTES NFR BLD AUTO: 6.6 % (ref 5–12)
NEUTROPHILS NFR BLD AUTO: 4.67 10*3/MM3 (ref 1.7–7)
NEUTROPHILS NFR BLD AUTO: 80.8 % (ref 42.7–76)
NITRITE UR QL STRIP: NEGATIVE
NRBC BLD AUTO-RTO: 0 /100 WBC (ref 0–0.2)
NT-PROBNP SERPL-MCNC: 3013 PG/ML (ref 0–900)
OPIATES UR QL: NEGATIVE
OXYCODONE UR QL SCN: NEGATIVE
PCO2 BLDA: 29 MM HG (ref 35–45)
PH BLDA: 7.42 PH UNITS (ref 7.35–7.45)
PH UR STRIP.AUTO: <=5 [PH] (ref 5–8)
PLATELET # BLD AUTO: 164 10*3/MM3 (ref 140–450)
PMV BLD AUTO: 10.7 FL (ref 6–12)
PO2 BLDA: 89.9 MM HG (ref 80–100)
POTASSIUM SERPL-SCNC: 3.7 MMOL/L (ref 3.5–5.2)
PROT SERPL-MCNC: 6.3 G/DL (ref 6–8.5)
PROT UR QL STRIP: ABNORMAL
PROTHROMBIN TIME: 20 SECONDS (ref 11.7–14.2)
RBC # BLD AUTO: 4.32 10*6/MM3 (ref 4.14–5.8)
RBC # UR STRIP: NORMAL /HPF
REF LAB TEST METHOD: NORMAL
SAO2 % BLDCOA: 97.3 % (ref 92–98.5)
SODIUM SERPL-SCNC: 134 MMOL/L (ref 136–145)
SP GR UR STRIP: >1.03 (ref 1–1.03)
SQUAMOUS #/AREA URNS HPF: NORMAL /HPF
TOTAL RATE: 18 BREATHS/MINUTE
TROPONIN T DELTA: -4 NG/L
TROPONIN T SERPL HS-MCNC: 85 NG/L
UROBILINOGEN UR QL STRIP: ABNORMAL
WBC # UR STRIP: NORMAL /HPF
WBC NRBC COR # BLD AUTO: 5.78 10*3/MM3 (ref 3.4–10.8)

## 2024-06-30 PROCEDURE — 83036 HEMOGLOBIN GLYCOSYLATED A1C: CPT | Performed by: HOSPITALIST

## 2024-06-30 PROCEDURE — 94761 N-INVAS EAR/PLS OXIMETRY MLT: CPT

## 2024-06-30 PROCEDURE — 82948 REAGENT STRIP/BLOOD GLUCOSE: CPT

## 2024-06-30 PROCEDURE — 36600 WITHDRAWAL OF ARTERIAL BLOOD: CPT

## 2024-06-30 PROCEDURE — 93005 ELECTROCARDIOGRAM TRACING: CPT | Performed by: EMERGENCY MEDICINE

## 2024-06-30 PROCEDURE — 80307 DRUG TEST PRSMV CHEM ANLYZR: CPT | Performed by: EMERGENCY MEDICINE

## 2024-06-30 PROCEDURE — 94799 UNLISTED PULMONARY SVC/PX: CPT

## 2024-06-30 PROCEDURE — 82077 ASSAY SPEC XCP UR&BREATH IA: CPT | Performed by: EMERGENCY MEDICINE

## 2024-06-30 PROCEDURE — 83735 ASSAY OF MAGNESIUM: CPT | Performed by: EMERGENCY MEDICINE

## 2024-06-30 PROCEDURE — 99285 EMERGENCY DEPT VISIT HI MDM: CPT

## 2024-06-30 PROCEDURE — 93010 ELECTROCARDIOGRAM REPORT: CPT | Performed by: INTERNAL MEDICINE

## 2024-06-30 PROCEDURE — 82803 BLOOD GASES ANY COMBINATION: CPT

## 2024-06-30 PROCEDURE — 25010000002 DIAZEPAM PER 5 MG: Performed by: EMERGENCY MEDICINE

## 2024-06-30 PROCEDURE — 94760 N-INVAS EAR/PLS OXIMETRY 1: CPT

## 2024-06-30 PROCEDURE — 25010000002 FUROSEMIDE PER 20 MG: Performed by: EMERGENCY MEDICINE

## 2024-06-30 PROCEDURE — 81001 URINALYSIS AUTO W/SCOPE: CPT | Performed by: EMERGENCY MEDICINE

## 2024-06-30 PROCEDURE — 85610 PROTHROMBIN TIME: CPT | Performed by: EMERGENCY MEDICINE

## 2024-06-30 PROCEDURE — 85025 COMPLETE CBC W/AUTO DIFF WBC: CPT | Performed by: EMERGENCY MEDICINE

## 2024-06-30 PROCEDURE — 71045 X-RAY EXAM CHEST 1 VIEW: CPT

## 2024-06-30 PROCEDURE — 25510000001 IOPAMIDOL PER 1 ML: Performed by: EMERGENCY MEDICINE

## 2024-06-30 PROCEDURE — 80053 COMPREHEN METABOLIC PANEL: CPT | Performed by: EMERGENCY MEDICINE

## 2024-06-30 PROCEDURE — 36415 COLL VENOUS BLD VENIPUNCTURE: CPT

## 2024-06-30 PROCEDURE — 80162 ASSAY OF DIGOXIN TOTAL: CPT | Performed by: EMERGENCY MEDICINE

## 2024-06-30 PROCEDURE — 83880 ASSAY OF NATRIURETIC PEPTIDE: CPT | Performed by: EMERGENCY MEDICINE

## 2024-06-30 PROCEDURE — 71275 CT ANGIOGRAPHY CHEST: CPT

## 2024-06-30 PROCEDURE — 84484 ASSAY OF TROPONIN QUANT: CPT | Performed by: EMERGENCY MEDICINE

## 2024-06-30 PROCEDURE — 25010000002 FUROSEMIDE PER 20 MG: Performed by: HOSPITALIST

## 2024-06-30 RX ORDER — SODIUM CHLORIDE 0.9 % (FLUSH) 0.9 %
10 SYRINGE (ML) INJECTION AS NEEDED
Status: DISCONTINUED | OUTPATIENT
Start: 2024-06-30 | End: 2024-07-01 | Stop reason: HOSPADM

## 2024-06-30 RX ORDER — HYDROXYZINE HYDROCHLORIDE 25 MG/1
25 TABLET, FILM COATED ORAL ONCE AS NEEDED
Status: DISCONTINUED | OUTPATIENT
Start: 2024-06-30 | End: 2024-07-01 | Stop reason: HOSPADM

## 2024-06-30 RX ORDER — ZOLPIDEM TARTRATE 10 MG/1
10 TABLET ORAL NIGHTLY PRN
COMMUNITY

## 2024-06-30 RX ORDER — SPIRONOLACTONE 25 MG/1
25 TABLET ORAL DAILY
Status: DISCONTINUED | OUTPATIENT
Start: 2024-06-30 | End: 2024-07-01 | Stop reason: HOSPADM

## 2024-06-30 RX ORDER — BISACODYL 10 MG
10 SUPPOSITORY, RECTAL RECTAL DAILY PRN
Status: DISCONTINUED | OUTPATIENT
Start: 2024-06-30 | End: 2024-07-01 | Stop reason: HOSPADM

## 2024-06-30 RX ORDER — ACETAMINOPHEN 160 MG/5ML
650 SOLUTION ORAL EVERY 4 HOURS PRN
Status: DISCONTINUED | OUTPATIENT
Start: 2024-06-30 | End: 2024-07-01 | Stop reason: HOSPADM

## 2024-06-30 RX ORDER — POLYETHYLENE GLYCOL 3350 17 G/17G
17 POWDER, FOR SOLUTION ORAL DAILY PRN
Status: DISCONTINUED | OUTPATIENT
Start: 2024-06-30 | End: 2024-07-01 | Stop reason: HOSPADM

## 2024-06-30 RX ORDER — SODIUM CHLORIDE 0.9 % (FLUSH) 0.9 %
10 SYRINGE (ML) INJECTION EVERY 12 HOURS SCHEDULED
Status: DISCONTINUED | OUTPATIENT
Start: 2024-06-30 | End: 2024-07-01 | Stop reason: HOSPADM

## 2024-06-30 RX ORDER — IBUPROFEN 600 MG/1
1 TABLET ORAL
Status: DISCONTINUED | OUTPATIENT
Start: 2024-06-30 | End: 2024-07-01 | Stop reason: HOSPADM

## 2024-06-30 RX ORDER — CARVEDILOL 12.5 MG/1
12.5 TABLET ORAL 2 TIMES DAILY WITH MEALS
Status: DISCONTINUED | OUTPATIENT
Start: 2024-06-30 | End: 2024-07-01 | Stop reason: HOSPADM

## 2024-06-30 RX ORDER — ASPIRIN 81 MG/1
81 TABLET ORAL DAILY
Status: DISCONTINUED | OUTPATIENT
Start: 2024-06-30 | End: 2024-07-01 | Stop reason: HOSPADM

## 2024-06-30 RX ORDER — DEXTROSE MONOHYDRATE 25 G/50ML
25 INJECTION, SOLUTION INTRAVENOUS
Status: DISCONTINUED | OUTPATIENT
Start: 2024-06-30 | End: 2024-07-01 | Stop reason: HOSPADM

## 2024-06-30 RX ORDER — BISACODYL 5 MG/1
5 TABLET, DELAYED RELEASE ORAL DAILY PRN
Status: DISCONTINUED | OUTPATIENT
Start: 2024-06-30 | End: 2024-07-01 | Stop reason: HOSPADM

## 2024-06-30 RX ORDER — INSULIN LISPRO 100 [IU]/ML
2-7 INJECTION, SOLUTION INTRAVENOUS; SUBCUTANEOUS
Status: DISCONTINUED | OUTPATIENT
Start: 2024-06-30 | End: 2024-07-01 | Stop reason: HOSPADM

## 2024-06-30 RX ORDER — AMOXICILLIN 250 MG
2 CAPSULE ORAL 2 TIMES DAILY PRN
Status: DISCONTINUED | OUTPATIENT
Start: 2024-06-30 | End: 2024-07-01 | Stop reason: HOSPADM

## 2024-06-30 RX ORDER — FUROSEMIDE 10 MG/ML
40 INJECTION INTRAMUSCULAR; INTRAVENOUS ONCE
Status: COMPLETED | OUTPATIENT
Start: 2024-06-30 | End: 2024-06-30

## 2024-06-30 RX ORDER — ONDANSETRON 2 MG/ML
4 INJECTION INTRAMUSCULAR; INTRAVENOUS EVERY 6 HOURS PRN
Status: DISCONTINUED | OUTPATIENT
Start: 2024-06-30 | End: 2024-07-01 | Stop reason: HOSPADM

## 2024-06-30 RX ORDER — SODIUM CHLORIDE 9 MG/ML
40 INJECTION, SOLUTION INTRAVENOUS AS NEEDED
Status: DISCONTINUED | OUTPATIENT
Start: 2024-06-30 | End: 2024-07-01 | Stop reason: HOSPADM

## 2024-06-30 RX ORDER — LORAZEPAM 1 MG/1
1 TABLET ORAL EVERY 6 HOURS PRN
Status: DISCONTINUED | OUTPATIENT
Start: 2024-06-30 | End: 2024-07-01 | Stop reason: HOSPADM

## 2024-06-30 RX ORDER — ACETAMINOPHEN 650 MG/1
650 SUPPOSITORY RECTAL EVERY 4 HOURS PRN
Status: DISCONTINUED | OUTPATIENT
Start: 2024-06-30 | End: 2024-07-01 | Stop reason: HOSPADM

## 2024-06-30 RX ORDER — ONDANSETRON 4 MG/1
4 TABLET, ORALLY DISINTEGRATING ORAL EVERY 6 HOURS PRN
Status: DISCONTINUED | OUTPATIENT
Start: 2024-06-30 | End: 2024-07-01 | Stop reason: HOSPADM

## 2024-06-30 RX ORDER — ACETAMINOPHEN 325 MG/1
650 TABLET ORAL EVERY 4 HOURS PRN
Status: DISCONTINUED | OUTPATIENT
Start: 2024-06-30 | End: 2024-07-01 | Stop reason: HOSPADM

## 2024-06-30 RX ORDER — NICOTINE POLACRILEX 4 MG
15 LOZENGE BUCCAL
Status: DISCONTINUED | OUTPATIENT
Start: 2024-06-30 | End: 2024-07-01 | Stop reason: HOSPADM

## 2024-06-30 RX ORDER — FUROSEMIDE 10 MG/ML
40 INJECTION INTRAMUSCULAR; INTRAVENOUS
Status: DISCONTINUED | OUTPATIENT
Start: 2024-06-30 | End: 2024-07-01 | Stop reason: HOSPADM

## 2024-06-30 RX ORDER — TAMSULOSIN HYDROCHLORIDE 0.4 MG/1
0.4 CAPSULE ORAL DAILY
Status: DISCONTINUED | OUTPATIENT
Start: 2024-06-30 | End: 2024-07-01 | Stop reason: HOSPADM

## 2024-06-30 RX ORDER — DIAZEPAM 5 MG/ML
2.5 INJECTION, SOLUTION INTRAMUSCULAR; INTRAVENOUS ONCE
Status: COMPLETED | OUTPATIENT
Start: 2024-06-30 | End: 2024-06-30

## 2024-06-30 RX ADMIN — FUROSEMIDE 40 MG: 10 INJECTION, SOLUTION INTRAMUSCULAR; INTRAVENOUS at 15:56

## 2024-06-30 RX ADMIN — LORAZEPAM 1 MG: 1 TABLET ORAL at 20:07

## 2024-06-30 RX ADMIN — IOPAMIDOL 95 ML: 755 INJECTION, SOLUTION INTRAVENOUS at 15:39

## 2024-06-30 RX ADMIN — TAMSULOSIN HYDROCHLORIDE 0.4 MG: 0.4 CAPSULE ORAL at 20:07

## 2024-06-30 RX ADMIN — CARVEDILOL 12.5 MG: 12.5 TABLET, FILM COATED ORAL at 19:06

## 2024-06-30 RX ADMIN — ASPIRIN 81 MG: 81 TABLET, COATED ORAL at 19:06

## 2024-06-30 RX ADMIN — FUROSEMIDE 40 MG: 10 INJECTION, SOLUTION INTRAMUSCULAR; INTRAVENOUS at 19:06

## 2024-06-30 RX ADMIN — DIAZEPAM 2.5 MG: 5 INJECTION, SOLUTION INTRAMUSCULAR; INTRAVENOUS at 16:29

## 2024-06-30 NOTE — H&P
HISTORY AND PHYSICAL   [facility]        Patient Identification:  Name: Jack Lopez  Age: 63 y.o.  Sex: male  :  1960  MRN: [id number]                     Primary Care Physician: Alvaro Geronimo APRN    Chief Complaint: Shortness of breath    History of Present Illness:       The patient  is a 63 y.o. male who presents to the [facility] complaining of this is a gentleman whose neighbor called EMS because of shortness of breath.  This patient stated his shortness of breath started again on the night prior to admission he could not sleep.  He has positive orthopnea as well as shortness of breath worse with exertion.  He has not been taking his Lasix for the past several days.  He states that his medicines have been changed and unable to pick them up from the pharmacist.  He denies any chest pressure.  He believes that he is in acute congestive heart failure.  He had a history of this in the past.  Complains of some increased swelling to his lower extremities as well.  His cardiologist is at [facility], [place].  He states that he just was seen in the ER at [facility].  He stated that he wanted to go to a different hospital.  He denies any abdominal pain, headache or focal weakness to arms or legs.  He states he is diabetic he had a CABG about 15 years ago he does not smoke or drink.  When EMS arrived he was tachypneic at a respiratory rate of about 30.  They placed upon oxygen with improvement of his respiratory status his oxygen saturation was 97%.  His blood pressure was about 112 he was sinus tachycardia in the low 100s.  Patient's blood glucose was normal.  He complains of a cough and feels as if he is congested his cough is productive of clear phlegm.  Denies fever.  The patient was evaluated in the ER and felt to be fluid overloaded.  He started on some IV Lasix and admitted for further evaluation treatment with probable exacerbation of CHF with acute on chronic systolic heart failure with ejection  fraction around 15 to 20%.    Past Medical History:  Past Medical History:   Diagnosis Date    Anxiety     Asthma     CAD (coronary artery disease)     Chronic systolic CHF (congestive heart failure)     Depression     Diabetes mellitus     Diastolic dysfunction     Hyperlipidemia     Hypertension     Ischemic cardiomyopathy     Mitral valve regurgitation     Myocardial infarction     NSTEMI (non-ST elevated myocardial infarction)     Prostatic hypertrophy     benign    Seasonal allergies      Past Surgical History:  Past Surgical History:   Procedure Laterality Date    CARDIAC SURGERY      CABG 4 vessel 2010      Home Meds:  Medications Prior to Admission   Medication Sig Dispense Refill Last Dose    ALPRAZolam (XANAX) 2 MG tablet Take 1 tablet by mouth Every Night.   Past Week    amphetamine-dextroamphetamine (ADDERALL) 10 MG tablet Take 2 tablets by mouth Daily.   Patient Taking Differently    aspirin 81 MG tablet Take 1 tablet by mouth Daily.   Past Month    carvedilol (COREG) 12.5 MG tablet Take 1 tablet by mouth.   Past Week    spironolactone (ALDACTONE) 25 MG tablet Take 1 tablet by mouth Daily.   Past Week    tamsulosin (FLOMAX) 0.4 MG capsule 24 hr capsule Take 1 capsule by mouth Daily.   Past Week    zolpidem (Ambien) 10 MG tablet Take 1 tablet by mouth At Night As Needed for Sleep.   6/29/2024    albuterol sulfate  (90 Base) MCG/ACT inhaler Inhale 2 puffs Every 4 (Four) Hours As Needed for Wheezing. 6.7 g 0 More than a month    atorvastatin (LIPITOR) 80 MG tablet TAKE 1 TABLET BY MOUTH DAILY DUE FOR LIPID PANEL IN AUGUST       benzonatate (TESSALON) 100 MG capsule Take 1 capsule by mouth 3 (Three) Times a Day As Needed for Cough. 15 capsule 0     cefdinir (OMNICEF) 300 MG capsule Take 1 capsule by mouth 2 (Two) Times a Day. 14 capsule 0     desvenlafaxine (PRISTIQ) 100 MG 24 hr tablet Take 1 tablet by mouth Daily.       glipizide (GLUCOTROL XL) 2.5 MG 24 hr tablet Take 1 tablet by mouth Daily.        hydrOXYzine (ATARAX) 25 MG tablet Take 1 tablet by mouth Every 6 (Six) Hours As Needed for Anxiety. 30 tablet 0     Lemborexant (DayVigo) 10 MG tablet Take  by mouth Every Night.       lisinopril (PRINIVIL,ZESTRIL) 5 MG tablet Take 1 tablet by mouth Daily.       metaxalone (SKELAXIN) 800 MG tablet Take 1 tablet by mouth 3 (Three) Times a Day. 15 tablet 0     methylPREDNISolone (MEDROL) 4 MG dose pack Take as directed on package instructions. 21 each 0     naproxen sodium (ANAPROX) 550 MG tablet Take 1 tablet by mouth 2 (Two) Times a Day As Needed for Mild Pain . 20 tablet 0     nitroglycerin (NITROSTAT) 0.4 MG SL tablet Place 1 tablet under the tongue.   More than a month    PARoxetine (PAXIL) 30 MG tablet Take 3 tablets by mouth Every Morning.       simethicone (Gas-X) 80 MG chewable tablet Chew 1 tablet Every 6 (Six) Hours As Needed for Flatulence. 30 tablet 0      Current meds    Current Facility-Administered Medications:     acetaminophen (TYLENOL) tablet 650 mg, 650 mg, Oral, Q4H PRN **OR** acetaminophen (TYLENOL) 160 MG/5ML oral solution 650 mg, 650 mg, Oral, Q4H PRN **OR** acetaminophen (TYLENOL) suppository 650 mg, 650 mg, Rectal, Q4H PRN, Hermann Romero MD    aspirin EC tablet 81 mg, 81 mg, Oral, Daily, Hermann Romero MD, 81 mg at 06/30/24 1906    sennosides-docusate (PERICOLACE) 8.6-50 MG per tablet 2 tablet, 2 tablet, Oral, BID PRN **AND** polyethylene glycol (MIRALAX) packet 17 g, 17 g, Oral, Daily PRN **AND** bisacodyl (DULCOLAX) EC tablet 5 mg, 5 mg, Oral, Daily PRN **AND** bisacodyl (DULCOLAX) suppository 10 mg, 10 mg, Rectal, Daily PRN, Hermann Romero MD    carvedilol (COREG) tablet 12.5 mg, 12.5 mg, Oral, BID With Meals, Hermann Romero MD, 12.5 mg at 06/30/24 1906    dextrose (D50W) (25 g/50 mL) IV injection 25 g, 25 g, Intravenous, Q15 Min PRN, Hermann Romero MD    dextrose (GLUTOSE) oral gel 15 g, 15 g, Oral, Q15 Min PRN, Hermann Romero MD    furosemide (LASIX) injection 40 mg, 40 mg,  Intravenous, BID, Hermann Romero MD, 40 mg at 06/30/24 1906    glucagon (GLUCAGEN) injection 1 mg, 1 mg, Intramuscular, Q15 Min PRN, Hermann Romero MD    insulin lispro (HUMALOG/ADMELOG) injection 2-7 Units, 2-7 Units, Subcutaneous, 4x Daily AC & at Bedtime, Hermann Romero MD    LORazepam (ATIVAN) tablet 1 mg, 1 mg, Oral, Q6H PRN, Hermann Romero MD    ondansetron ODT (ZOFRAN-ODT) disintegrating tablet 4 mg, 4 mg, Oral, Q6H PRN **OR** ondansetron (ZOFRAN) injection 4 mg, 4 mg, Intravenous, Q6H PRN, Hermann Romero MD    [COMPLETED] Insert Peripheral IV, , , Once **AND** sodium chloride 0.9 % flush 10 mL, 10 mL, Intravenous, PRN, Abundio Amaya MD    sodium chloride 0.9 % flush 10 mL, 10 mL, Intravenous, Q12H, Hermann Romero MD    sodium chloride 0.9 % flush 10 mL, 10 mL, Intravenous, PRN, Hermann Romero MD    sodium chloride 0.9 % infusion 40 mL, 40 mL, Intravenous, PRN, Hermann Romero MD    spironolactone (ALDACTONE) tablet 25 mg, 25 mg, Oral, Daily, Hermann Romero MD    tamsulosin (FLOMAX) 24 hr capsule 0.4 mg, 0.4 mg, Oral, Daily, Hermann Romero MD  Allergies:  Allergies   Allergen Reactions    Metformin Diarrhea and Unknown - Low Severity     diarrhea     Immunizations:    There is no immunization history on file for this patient.  Social History:   Social History     Social History Narrative    Not on file     Social History     Socioeconomic History    Marital status: Single   Tobacco Use    Smoking status: Never   Vaping Use    Vaping status: Never Used   Substance and Sexual Activity    Alcohol use: No    Drug use: No    Sexual activity: Defer       Family History:  Family History   Problem Relation Age of Onset    No Known Problems Mother     No Known Problems Father     No Known Problems Sister         Review of Systems  See history of present illness and past medical history.  Patient denies headache, dizziness, syncope, falls, trauma, change in vision, change in hearing, change in taste, changes in  "weight, changes in appetite, focal weakness, numbness, or paresthesia.  Patient denies chest pain, palpitations, dyspnea, orthopnea, PND, cough, sinus pressure, rhinorrhea, epistaxis, hemoptysis, nausea, vomiting, hematemesis, diarrhea, constipation or hematochezia. Denies cold or heat intolerance, polydipsia, polyuria, polyphagia. Denies hematuria, pyuria, dysuria, hesitancy, frequency or urgency. Denies consumption of raw and under cooked meats foods or change in water source.  Denies fever, chills, sweats, night sweats.  Denies missing any routine medications. Remainder of ROS is negative.    Objective:  tMax 24 hrs: Temp (24hrs), Av.5 °F (36.4 °C), Min:97.5 °F (36.4 °C), Max:97.6 °F (36.4 °C)    Vitals Ranges:   Temp:  [97.5 °F (36.4 °C)-97.6 °F (36.4 °C)] 97.5 °F (36.4 °C)  Heart Rate:  [110-121] 115  Resp:  [24-30] 24  BP: (100-119)/(79-87) 117/87      Exam:  /87 (BP Location: Right arm, Patient Position: Lying)   Pulse 115   Temp 97.5 °F (36.4 °C) (Oral)   Resp 24   Ht 175.3 cm (69.02\")   Wt 75 kg (165 lb 4.8 oz)   SpO2 98%   BMI 24.40 kg/m²     General Appearance:    Alert, cooperative, no distress, appears stated age   Head:    Normocephalic, without obvious abnormality, atraumatic   Eyes:    PERRL, conjunctivae/corneas clear, EOM's intact, both eyes   Ears:    Normal external ear canals, both ears   Nose:   Nares normal, septum midline, mucosa normal, no drainage    or sinus tenderness   Throat:   Lips, mucosa, and tongue normal   Neck:   Supple, symmetrical, trachea midline, no adenopathy;     thyroid:  no enlargement/tenderness/nodules; no carotid    bruit or JVD   Back:     Symmetric, no curvature, ROM normal, no CVA tenderness   Lungs:   Crackles in the bases bilaterally, respirations unlabored   Chest Wall:    No tenderness or deformity    Heart:    Regular rate and rhythm, S1 and S2 normal, no murmur, rub   or gallop   Abdomen:     Soft, nontender, bowel sounds active all four " quadrants,     no masses, no hepatomegaly, no splenomegaly   Extremities:   Extremities normal, atraumatic, no cyanosis some leg and pedal edema   Pulses:   2+ and symmetric all extremities   Skin:   Skin color, texture, turgor normal, no rashes or lesions   Lymph nodes:   Cervical, supraclavicular, and axillary nodes normal   Neurologic:   CNII-XII intact, normal strength, sensation intact throughout      .    Data Review:  Lab Results (last 72 hours)       Procedure Component Value Units Date/Time    POC Glucose Once [[id number]]  (Normal) Collected: 06/30/24 1650    Specimen: Blood Updated: 06/30/24 1652     Glucose 122 mg/dL     High Sensitivity Troponin T 2Hr [[id number]]  (Abnormal) Collected: 06/30/24 1609    Specimen: Blood from Arm, Right Updated: 06/30/24 1650     HS Troponin T 81 ng/L      Troponin T Delta -4 ng/L     Narrative:      High Sensitive Troponin T Reference Range:  <14.0 ng/L- Negative Female for AMI  <22.0 ng/L- Negative Male for AMI  >=14 - Abnormal Female indicating possible myocardial injury.  >=22 - Abnormal Male indicating possible myocardial injury.   Clinicians would have to utilize clinical acumen, EKG, Troponin, and serial changes to determine if it is an Acute Myocardial Infarction or myocardial injury due to an underlying chronic condition.         Urinalysis, Microscopic Only - Urine, Clean Catch [[id number]] Collected: 06/30/24 1621    Specimen: Urine, Clean Catch Updated: 06/30/24 1649     RBC, UA 0-2 /HPF      WBC, UA 0-2 /HPF      Bacteria, UA None Seen /HPF      Squamous Epithelial Cells, UA 0-2 /HPF      Hyaline Casts, UA 3-6 /LPF      Methodology Automated Microscopy    Urinalysis With Microscopic If Indicated (No Culture) - Urine, Clean Catch [[id number]]  (Abnormal) Collected: 06/30/24 1621    Specimen: Urine, Clean Catch Updated: 06/30/24 1646     Color, UA Dark Yellow     Appearance, UA Cloudy     pH, UA <=5.0     Specific Gravity, UA >1.030     Glucose, UA Negative      Ketones, UA Negative     Bilirubin, UA Negative     Blood, UA Negative     Protein, UA 30 mg/dL (1+)     Leuk Esterase, UA Trace     Nitrite, UA Negative     Urobilinogen, UA 1.0 E.U./dL    Urine Drug Screen - Urine, Clean Catch [[id number]] Collected: 06/30/24 1621    Specimen: Urine, Clean Catch Updated: 06/30/24 1636    Blood Gas, Arterial - [[id number]]  (Abnormal) Collected: 06/30/24 1607    Specimen: Arterial Blood Updated: 06/30/24 1610     Site Left Radial     Mark's Test Positive     pH, Arterial 7.420 pH units      pCO2, Arterial 29.0 mm Hg      pO2, Arterial 89.9 mm Hg      HCO3, Arterial 18.8 mmol/L      Base Excess, Arterial -4.4 mmol/L      Comment: Serial Number: [id number]:  ekiper        O2 Saturation, Arterial 97.3 %      CO2 Content 19.7 mmol/L      Barometric Pressure for Blood Gas 750.7000 mmHg      Modality Cannula     Flow Rate 2.0000 lpm      Rate 18 Breaths/minute      Hemodilution No    Ethanol [[id number]] Collected: 06/30/24 1259    Specimen: Blood from Arm, Right Updated: 06/30/24 1348     Ethanol <10 mg/dL      Ethanol % <0.010 %     Digoxin Level [[id number]]  (Abnormal) Collected: 06/30/24 1259    Specimen: Blood from Arm, Right Updated: 06/30/24 1343     Digoxin <0.30 ng/mL     High Sensitivity Troponin T [[id number]]  (Abnormal) Collected: 06/30/24 1259    Specimen: Blood from Arm, Right Updated: 06/30/24 1343     HS Troponin T 85 ng/L     Narrative:      High Sensitive Troponin T Reference Range:  <14.0 ng/L- Negative Female for AMI  <22.0 ng/L- Negative Male for AMI  >=14 - Abnormal Female indicating possible myocardial injury.  >=22 - Abnormal Male indicating possible myocardial injury.   Clinicians would have to utilize clinical acumen, EKG, Troponin, and serial changes to determine if it is an Acute Myocardial Infarction or myocardial injury due to an underlying chronic condition.         Comprehensive Metabolic Panel [[id number]]  (Abnormal) Collected: 06/30/24 1259    Specimen:  Blood from Arm, Right Updated: 06/30/24 1341     Glucose 170 mg/dL      BUN 23 mg/dL      Creatinine 1.32 mg/dL      Sodium 134 mmol/L      Potassium 3.7 mmol/L      Chloride 103 mmol/L      CO2 16.6 mmol/L      Calcium 8.8 mg/dL      Total Protein 6.3 g/dL      Albumin 3.8 g/dL      ALT (SGPT) 232 U/L      AST (SGOT) 145 U/L      Alkaline Phosphatase 74 U/L      Total Bilirubin 1.6 mg/dL      Globulin 2.5 gm/dL      A/G Ratio 1.5 g/dL      BUN/Creatinine Ratio 17.4     Anion Gap 14.4 mmol/L      eGFR 61.0 mL/min/1.73     Narrative:      GFR Normal >60  Chronic Kidney Disease <60  Kidney Failure <15      BNP [[id number]]  (Abnormal) Collected: 06/30/24 1259    Specimen: Blood from Arm, Right Updated: 06/30/24 1341     proBNP 3,013.0 pg/mL     Narrative:      This assay is used as an aid in the diagnosis of individuals suspected of having heart failure. It can be used as an aid in the diagnosis of acute decompensated heart failure (ADHF) in patients presenting with signs and symptoms of ADHF to the emergency department (ED). In addition, NT-proBNP of <300 pg/mL indicates ADHF is not likely.    Age Range Result Interpretation  NT-proBNP Concentration (pg/mL:      <50             Positive            >450                   Gray                 300-450                    Negative             <300    50-75           Positive            >900                  Gray                300-900                  Negative            <300      >75             Positive            >1800                  Gray                300-1800                  Negative            <300    Magnesium [[id number]]  (Normal) Collected: 06/30/24 1259    Specimen: Blood from Arm, Right Updated: 06/30/24 1341     Magnesium 1.6 mg/dL     Protime-INR [[id number]]  (Abnormal) Collected: 06/30/24 1259    Specimen: Blood from Arm, Right Updated: 06/30/24 1328     Protime 20.0 Seconds      INR 1.68    CBC & Differential [[id number]]  (Abnormal) Collected: 06/30/24  1259    Specimen: Blood from Arm, Right Updated: 06/30/24 1318    Narrative:      The following orders were created for panel order CBC & Differential.  Procedure                               Abnormality         Status                     ---------                               -----------         ------                     CBC Auto Differential[[id number]]        Abnormal            Final result                 Please view results for these tests on the individual orders.    CBC Auto Differential [[id number]]  (Abnormal) Collected: 06/30/24 1259    Specimen: Blood from Arm, Right Updated: 06/30/24 1318     WBC 5.78 10*3/mm3      RBC 4.32 10*6/mm3      Hemoglobin 13.0 g/dL      Hematocrit 40.6 %      MCV 94.0 fL      MCH 30.1 pg      MCHC 32.0 g/dL      RDW 15.3 %      RDW-SD 53.0 fl      MPV 10.7 fL      Platelets 164 10*3/mm3      Neutrophil % 80.8 %      Lymphocyte % 12.1 %      Monocyte % 6.6 %      Eosinophil % 0.0 %      Basophil % 0.0 %      Immature Grans % 0.5 %      Neutrophils, Absolute 4.67 10*3/mm3      Lymphocytes, Absolute 0.70 10*3/mm3      Monocytes, Absolute 0.38 10*3/mm3      Eosinophils, Absolute 0.00 10*3/mm3      Basophils, Absolute 0.00 10*3/mm3      Immature Grans, Absolute 0.03 10*3/mm3      nRBC 0.0 /100 WBC                      Imaging Results (All)       Procedure Component Value Units Date/Time    CT Angiogram Chest [[id number]] Collected: 06/30/24 1559     Updated: 06/30/24 1559    Narrative:      CT ANGIOGRAM OF THE CHEST. MULTIPLE CORONAL, SAGITTAL, AND 3-D  RECONSTRUCTIONS.     HISTORY: 63-year-old male with shortness of breath.     TECHNIQUE: Radiation dose reduction techniques were utilized, including  automated exposure control and exposure modulation based on body size.   CT angiogram of the chest was performed following the administration of  IV contrast. Multiple coronal, sagittal, and 3-D reconstruction images  were obtained. Compared with prior 2/13/2024.     FINDINGS:  1.  There is very heterogeneous admixture of contrast within the  pulmonary arteries. There is a small linear low-attenuation focus at the  left main pulmonary artery which is suspected to be artifact and related  to heterogeneous admixture, but a tiny thrombus, although considered  unlikely, can't be entirely excluded, series 5, image 56. There is  otherwise no pulmonary thromboemboli bilaterally.     2. There is a moderate size right pleural effusion and a small-moderate  left pleural effusion which layers to the lung apices. There is mild  interstitial edema and some haziness in both lungs likely representing  edema. There is also mild 3rd spacing within the body wall.     There are a few very small patchy groundglass opacities at the lateral  right apex which are indeterminate and may represent components of  edema, but small infectious infiltrates can't be excluded, series 6  image 29. There were similar but more prominent similar appearing  airspace opacities previously at the upper lobes which have resolved.     3. There is no pericardial effusion. There is no definitive  lymphadenopathy within the chest. Mediastinal nodes appear slightly more  plump than previously, but nodes typically enlarged in the setting of  CHF.     4. Incidentally noted is cholelithiasis without convincing evidence for  cholecystitis.       XR Chest 1 View [[id number]] Collected: 06/30/24 1352     Updated: 06/30/24 1406    Narrative:      XR CHEST 1 VW-     HISTORY: Male who is 63 years-old, short of breath     TECHNIQUE: Frontal view of the chest     COMPARISON: 2/13/2024     FINDINGS: The heart is enlarged. Pulmonary vasculature is unremarkable.  Sternotomy wires are present. No focal pulmonary consolidation, pleural  effusion, or pneumothorax. No acute osseous process.       Impression:      No focal pulmonary consolidation. Cardiomegaly. Follow-up as  clinical indications persist.     This report was finalized on 6/30/2024 2:03 PM by  Dr. Damien Suárez M.D on Workstation: [id number]             Past Medical History:   Diagnosis Date    Anxiety     Asthma     CAD (coronary artery disease)     Chronic systolic CHF (congestive heart failure)     Depression     Diabetes mellitus     Diastolic dysfunction     Hyperlipidemia     Hypertension     Ischemic cardiomyopathy     Mitral valve regurgitation     Myocardial infarction     NSTEMI (non-ST elevated myocardial infarction)     Prostatic hypertrophy     benign    Seasonal allergies        Assessment:  Active Hospital Problems    Diagnosis  POA    **Acute on chronic congestive heart failure [I50.9]  Yes    History of noncompliance with medical treatment, presenting hazards to health [Z91.199]  Not Applicable    Ischemic cardiomyopathy [I25.5]  Unknown    Troponin level elevated [R79.89]  Unknown    Acute on chronic combined systolic and diastolic CHF (congestive heart failure) [I50.43]  Yes    CAD (coronary artery disease) [I25.10]  Yes    DM (diabetes mellitus), type 2 [E11.9]  Yes    Hyperlipemia [E78.5]  Yes      Resolved Hospital Problems   No resolved problems to display.       Plan:  The patient admitted to hospital continue with IV Lasix.  Ask for cardiology consult.  Follow-up on labs.  Encourage patient to be more compliant with medications and treatment.    Hermann Romero MD  6/30/2024  19:54 EDT

## 2024-06-30 NOTE — ED NOTES
".Nursing report ED to floor  Jack Lopez  63 y.o.  male    HPI :  HPI (Adult)  Stated Reason for Visit: shortness of breath, tachycardia  History Obtained From: patient, EMS    Chief Complaint  Chief Complaint   Patient presents with    Shortness of Breath    Rapid Heart Rate       Admitting doctor:   Hermann Romero MD    Admitting diagnosis:   The primary encounter diagnosis was Acute on chronic congestive heart failure, unspecified heart failure type. Diagnoses of Ischemic cardiomyopathy, History of noncompliance with medical treatment, presenting hazards to health, and Troponin level elevated were also pertinent to this visit.    Code status:   Current Code Status       Date Active Code Status Order ID Comments User Context       Prior            Allergies:   Metformin    Isolation:   No active isolations    Intake and Output  No intake or output data in the 24 hours ending 06/30/24 1543    Weight:       06/30/24  1248   Weight: 79.4 kg (175 lb 0.7 oz)       Most recent vitals:   Vitals:    06/30/24 1248 06/30/24 1302 06/30/24 1431 06/30/24 1531   BP: 119/85  105/82 100/79   Pulse: 115  (!) 121    Resp: (!) 29      Temp:  97.6 °F (36.4 °C)     TempSrc:  Tympanic     SpO2: 95%  94%    Weight: 79.4 kg (175 lb 0.7 oz)      Height: 175.3 cm (69.02\")          Active LDAs/IV Access:   Lines, Drains & Airways       Active LDAs       Name Placement date Placement time Site Days    Peripheral IV 06/30/24 1245 Anterior;Distal;Left;Upper Arm 06/30/24  1245  Arm  less than 1    Peripheral IV 06/30/24 1248 Anterior;Right Forearm 06/30/24  1248  Forearm  less than 1                    Labs (abnormal labs have a star):   Labs Reviewed   COMPREHENSIVE METABOLIC PANEL - Abnormal; Notable for the following components:       Result Value    Glucose 170 (*)     Creatinine 1.32 (*)     Sodium 134 (*)     CO2 16.6 (*)     ALT (SGPT) 232 (*)     AST (SGOT) 145 (*)     Total Bilirubin 1.6 (*)     All other components within normal " limits    Narrative:     GFR Normal >60  Chronic Kidney Disease <60  Kidney Failure <15     PROTIME-INR - Abnormal; Notable for the following components:    Protime 20.0 (*)     INR 1.68 (*)     All other components within normal limits   BNP (IN-HOUSE) - Abnormal; Notable for the following components:    proBNP 3,013.0 (*)     All other components within normal limits    Narrative:     This assay is used as an aid in the diagnosis of individuals suspected of having heart failure. It can be used as an aid in the diagnosis of acute decompensated heart failure (ADHF) in patients presenting with signs and symptoms of ADHF to the emergency department (ED). In addition, NT-proBNP of <300 pg/mL indicates ADHF is not likely.    Age Range Result Interpretation  NT-proBNP Concentration (pg/mL:      <50             Positive            >450                   Gray                 300-450                    Negative             <300    50-75           Positive            >900                  Gray                300-900                  Negative            <300      >75             Positive            >1800                  Gray                300-1800                  Negative            <300   TROPONIN - Abnormal; Notable for the following components:    HS Troponin T 85 (*)     All other components within normal limits    Narrative:     High Sensitive Troponin T Reference Range:  <14.0 ng/L- Negative Female for AMI  <22.0 ng/L- Negative Male for AMI  >=14 - Abnormal Female indicating possible myocardial injury.  >=22 - Abnormal Male indicating possible myocardial injury.   Clinicians would have to utilize clinical acumen, EKG, Troponin, and serial changes to determine if it is an Acute Myocardial Infarction or myocardial injury due to an underlying chronic condition.        CBC WITH AUTO DIFFERENTIAL - Abnormal; Notable for the following components:    Neutrophil % 80.8 (*)     Lymphocyte % 12.1 (*)     Eosinophil % 0.0 (*)      All other components within normal limits   DIGOXIN LEVEL - Abnormal; Notable for the following components:    Digoxin <0.30 (*)     All other components within normal limits   MAGNESIUM - Normal   ETHANOL   URINALYSIS W/ MICROSCOPIC IF INDICATED (NO CULTURE)   URINE DRUG SCREEN   HIGH SENSITIVITIY TROPONIN T 2HR   CBC AND DIFFERENTIAL    Narrative:     The following orders were created for panel order CBC & Differential.  Procedure                               Abnormality         Status                     ---------                               -----------         ------                     CBC Auto Differential[[id number]]        Abnormal            Final result                 Please view results for these tests on the individual orders.       EKG:   ECG 12 Lead Dyspnea   Preliminary Result   HEART LVFZ=864  bpm   RR Bwzjouyx=944  ms   NE Xxwnkztp=978  ms   P Horizontal Axis=-25  deg   P Front Axis=45  deg   QRSD Spxcyfaq=790  ms   QT Mgjofeec=787  ms   TTsB=122  ms   QRS Axis=-1  deg   T Wave Axis=165  deg   - ABNORMAL ECG -   Sinus arrhythmia   Multiple ventricular premature complexes   Anterior infarct, old   Nonspecific T abnormalities, lateral leads   Date and Time of Study:2024-06-30 13:05:03      Telemetry Scan   Final Result      Telemetry Scan   Final Result          Meds given in ED:   Medications   sodium chloride 0.9 % flush 10 mL (has no administration in time range)   furosemide (LASIX) injection 40 mg (has no administration in time range)   iopamidol (ISOVUE-370) 76 % injection 95 mL (95 mL Intravenous Given 6/30/24 1530)       Imaging results:  XR Chest 1 View    Result Date: 6/30/2024  No focal pulmonary consolidation. Cardiomegaly. Follow-up as clinical indications persist.  This report was finalized on 6/30/2024 2:03 PM by Dr. Damien Suárez M.D on Workstation: [id number]       Ambulatory status:   -     Social issues:   Social History     Socioeconomic History    Marital status: Single    Tobacco Use    Smoking status: Never   Substance and Sexual Activity    Alcohol use: No    Drug use: No       Peripheral Neurovascular  Peripheral Neurovascular (Adult)  Peripheral Neurovascular WDL: .WDL except, all, pulse assessment  Pulse Assessment: dorsalis pedis  Additional Documentation: Edema (Group)  Edema  Edema: dependent, foot, left, foot, right  Dependent Edema: 2+ (Mild)  Foot, Left Edema: 2+ (Mild)  Foot, Right Edema: 2+ (Mild)  LLE Neurovascular Assessment  Temperature LLE: cool  Color LLE: no discoloration  Sensation LLE: no tenderness, no numbness, no tingling  RLE Neurovascular Assessment  Temperature RLE: cool  Color RLE: no discoloration  Sensation RLE: no tenderness, no tingling, no numbness    Neuro Cognitive  Neuro Cognitive (Adult)  Cognitive/Neuro/Behavioral WDL: WDL    Learning  Learning Assessment (Adult)  Learning Readiness and Ability: no barriers identified    Respiratory  Respiratory (Adult)  Additional Documentation: Breath Sounds (Group)  Respiratory WDL  Respiratory WDL: .WDL except, all  Rhythm/Pattern, Respiratory: shortness of breath, tachypneic  Expansion/Accessory Muscles/Retractions: abdominal muscle use, accessory muscle use  Cough Frequency: frequent  Cough Type: congested, productive  Breath Sounds  Breath Sounds: All Fields  All Lung Fields Breath Sounds: crackles    Abdominal Pain       Pain Assessments  Pain (Adult)  (0-10) Pain Rating: Rest: 0         Gsoia Welch RN  06/30/24 15:43 EDT

## 2024-06-30 NOTE — ED NOTES
"Nursing report ED to floor  Jack Lopez  63 y.o.  male    HPI :  HPI (Adult)  Stated Reason for Visit: shortness of breath, tachycardia  History Obtained From: patient, EMS    Chief Complaint  Chief Complaint   Patient presents with    Shortness of Breath    Rapid Heart Rate       Admitting doctor:   Hermann Romero MD    Admitting diagnosis:   The primary encounter diagnosis was Acute on chronic congestive heart failure, unspecified heart failure type. Diagnoses of Ischemic cardiomyopathy, History of noncompliance with medical treatment, presenting hazards to health, and Troponin level elevated were also pertinent to this visit.    Code status:   Current Code Status       Date Active Code Status Order ID Comments User Context       Prior            Allergies:   Metformin    Isolation:   No active isolations    Intake and Output  No intake or output data in the 24 hours ending 06/30/24 1544    Weight:       06/30/24  1248   Weight: 79.4 kg (175 lb 0.7 oz)       Most recent vitals:   Vitals:    06/30/24 1248 06/30/24 1302 06/30/24 1431 06/30/24 1531   BP: 119/85  105/82 100/79   Pulse: 115  (!) 121    Resp: (!) 29      Temp:  97.6 °F (36.4 °C)     TempSrc:  Tympanic     SpO2: 95%  94%    Weight: 79.4 kg (175 lb 0.7 oz)      Height: 175.3 cm (69.02\")          Active LDAs/IV Access:   Lines, Drains & Airways       Active LDAs       Name Placement date Placement time Site Days    Peripheral IV 06/30/24 1245 Anterior;Distal;Left;Upper Arm 06/30/24  1245  Arm  less than 1    Peripheral IV 06/30/24 1248 Anterior;Right Forearm 06/30/24  1248  Forearm  less than 1                    Labs (abnormal labs have a star):   Labs Reviewed   COMPREHENSIVE METABOLIC PANEL - Abnormal; Notable for the following components:       Result Value    Glucose 170 (*)     Creatinine 1.32 (*)     Sodium 134 (*)     CO2 16.6 (*)     ALT (SGPT) 232 (*)     AST (SGOT) 145 (*)     Total Bilirubin 1.6 (*)     All other components within normal " limits    Narrative:     GFR Normal >60  Chronic Kidney Disease <60  Kidney Failure <15     PROTIME-INR - Abnormal; Notable for the following components:    Protime 20.0 (*)     INR 1.68 (*)     All other components within normal limits   BNP (IN-HOUSE) - Abnormal; Notable for the following components:    proBNP 3,013.0 (*)     All other components within normal limits    Narrative:     This assay is used as an aid in the diagnosis of individuals suspected of having heart failure. It can be used as an aid in the diagnosis of acute decompensated heart failure (ADHF) in patients presenting with signs and symptoms of ADHF to the emergency department (ED). In addition, NT-proBNP of <300 pg/mL indicates ADHF is not likely.    Age Range Result Interpretation  NT-proBNP Concentration (pg/mL:      <50             Positive            >450                   Gray                 300-450                    Negative             <300    50-75           Positive            >900                  Gray                300-900                  Negative            <300      >75             Positive            >1800                  Gray                300-1800                  Negative            <300   TROPONIN - Abnormal; Notable for the following components:    HS Troponin T 85 (*)     All other components within normal limits    Narrative:     High Sensitive Troponin T Reference Range:  <14.0 ng/L- Negative Female for AMI  <22.0 ng/L- Negative Male for AMI  >=14 - Abnormal Female indicating possible myocardial injury.  >=22 - Abnormal Male indicating possible myocardial injury.   Clinicians would have to utilize clinical acumen, EKG, Troponin, and serial changes to determine if it is an Acute Myocardial Infarction or myocardial injury due to an underlying chronic condition.        CBC WITH AUTO DIFFERENTIAL - Abnormal; Notable for the following components:    Neutrophil % 80.8 (*)     Lymphocyte % 12.1 (*)     Eosinophil % 0.0 (*)      All other components within normal limits   DIGOXIN LEVEL - Abnormal; Notable for the following components:    Digoxin <0.30 (*)     All other components within normal limits   MAGNESIUM - Normal   ETHANOL   URINALYSIS W/ MICROSCOPIC IF INDICATED (NO CULTURE)   URINE DRUG SCREEN   HIGH SENSITIVITIY TROPONIN T 2HR   CBC AND DIFFERENTIAL    Narrative:     The following orders were created for panel order CBC & Differential.  Procedure                               Abnormality         Status                     ---------                               -----------         ------                     CBC Auto Differential[[id number]]        Abnormal            Final result                 Please view results for these tests on the individual orders.       EKG:   ECG 12 Lead Dyspnea   Preliminary Result   HEART ANTR=484  bpm   RR Rfdmoiiw=244  ms   MS Xhhwrlqo=075  ms   P Horizontal Axis=-25  deg   P Front Axis=45  deg   QRSD Rjhbynja=926  ms   QT Lgvypclf=207  ms   AFsQ=325  ms   QRS Axis=-1  deg   T Wave Axis=165  deg   - ABNORMAL ECG -   Sinus arrhythmia   Multiple ventricular premature complexes   Anterior infarct, old   Nonspecific T abnormalities, lateral leads   Date and Time of Study:2024-06-30 13:05:03      Telemetry Scan   Final Result      Telemetry Scan   Final Result          Meds given in ED:   Medications   sodium chloride 0.9 % flush 10 mL (has no administration in time range)   furosemide (LASIX) injection 40 mg (has no administration in time range)   iopamidol (ISOVUE-370) 76 % injection 95 mL (95 mL Intravenous Given 6/30/24 1533)       Imaging results:  XR Chest 1 View    Result Date: 6/30/2024  No focal pulmonary consolidation. Cardiomegaly. Follow-up as clinical indications persist.  This report was finalized on 6/30/2024 2:03 PM by Dr. Damien Suárez M.D on Workstation: [id number]       Ambulatory status:   - assist x1    Social issues:   Social History     Socioeconomic History    Marital  status: Single   Tobacco Use    Smoking status: Never   Substance and Sexual Activity    Alcohol use: No    Drug use: No       Peripheral Neurovascular  Peripheral Neurovascular (Adult)  Peripheral Neurovascular WDL: .WDL except, all, pulse assessment  Pulse Assessment: dorsalis pedis  Additional Documentation: Edema (Group)  Edema  Edema: dependent, foot, left, foot, right  Dependent Edema: 2+ (Mild)  Foot, Left Edema: 2+ (Mild)  Foot, Right Edema: 2+ (Mild)  LLE Neurovascular Assessment  Temperature LLE: cool  Color LLE: no discoloration  Sensation LLE: no tenderness, no numbness, no tingling  RLE Neurovascular Assessment  Temperature RLE: cool  Color RLE: no discoloration  Sensation RLE: no tenderness, no tingling, no numbness    Neuro Cognitive  Neuro Cognitive (Adult)  Cognitive/Neuro/Behavioral WDL: WDL    Learning  Learning Assessment (Adult)  Learning Readiness and Ability: no barriers identified    Respiratory  Respiratory (Adult)  Additional Documentation: Breath Sounds (Group)  Respiratory WDL  Respiratory WDL: .WDL except, all  Rhythm/Pattern, Respiratory: shortness of breath, tachypneic  Expansion/Accessory Muscles/Retractions: abdominal muscle use, accessory muscle use  Cough Frequency: frequent  Cough Type: congested, productive  Breath Sounds  Breath Sounds: All Fields  All Lung Fields Breath Sounds: crackles    Abdominal Pain       Pain Assessments  Pain (Adult)  (0-10) Pain Rating: Rest: 0    NIH Stroke Scale       Jolie Huston RN  06/30/24 15:44 EDT

## 2024-06-30 NOTE — ED TRIAGE NOTES
Patient arrives via [facility] for complaints of shortness of breath and tachycardia. Patient is alert and oriented x4. Patient has a history of CHF. Patient arrives to the ED on 2L NC and typically does not require oxygen.

## 2024-06-30 NOTE — ED PROVIDER NOTES
EMERGENCY DEPARTMENT ENCOUNTER    Room Number:  SHILO/SHILO  Date of encounter:  6/30/2024  PCP: Alvaro Geronimo APRN  Historian: Patient and EMS and old records  Relevant information and history provided by sources other than the patient will be included below and in the ED Course.  Review of pertinent past medical records may also be included in record below and ED Course.    HPI:  Chief Complaint: Shortness of breath  A complete HPI/ROS/PMH/PSH/SH/FH are unobtainable due to: Not applicable  Context: Jack Lopez is a 63 y.o. male who presents to the ED c/o this is a gentleman whose neighbor called EMS because of shortness of breath.  This patient stated his shortness of breath started again last night he could not sleep.  He has positive orthopnea as well as shortness of breath worse with exertion.  He has not been taking his Lasix for the past several days.  He states that his medicines have been changed and unable to pick them up from the pharmacist.  He denies any chest pressure.  He believes that he is in acute congestive heart failure.  He had a history of this in the past.  Complains of some increased swelling to his lower extremities as well.  His cardiologist is at [facility].  He states that he just was seen in the ER at [facility].  He stated that he wanted to go to a different hospital.  He denies any abdominal pain, headache or focal weakness to arms or legs.  He states he is diabetic he had a CABG about 15 years ago he does not smoke or drink.  When EMS arrived he was tachypneic at a respiratory rate of about 30.  They placed upon oxygen with improvement of his respiratory status his oxygen saturation was 97%.  His blood pressure was about 112 he was sinus tachycardia in the low 100s.  Patient's blood glucose was normal.  He complains of a cough and feels as if he is congested his cough is productive of clear phlegm.  Denies fever.        Previous Episodes: Yes multiple times in the past  Current  Symptoms: See above    MEDICAL HISTORY REVIEWED  I reviewed the note from [place] emergency department from 6/28/2024.  This patient presented with shortness of breath had an open bottle you have Ambien which was found with pills spread around the table and he was found lying in the entrance way of the apartment of his neighbors.  He was tachypneic and appeared dusky.  He had a CT angiogram of the chest to rule out a PE on the CT scan that showed pulmonary edema with moderate to large bilateral pleural effusions he had a new 5 mm nodule in the right lung apex.  He had CT scan of the abdomen and pelvis on 6/28/2024 as well which showed diffuse bladder wall thickening concerning for potential acute cystitis cholelithiasis without cholecystitis he did have a small amount of ascites in the abdomen and pelvis consistent and agreement with his CT scan of the chest.  They wanted to admit him to the hospital but he refused admission and he left AGAINST MEDICAL ADVICE.    His hemoglobin was 12.9 his white blood cell count was patient had an AST of 128 and ALT of 216 his creatinine was 1.54 with a BUN of 22 potassium was 5.2 sodium was 134.  Troponin was elevated at 0.046.  He had a positive talk screen for amphetamines and benzodiazepine BNP was elevated a little under 2000 troponin as mentioned above was elevated dimer was elevated and why the CT scan of the chest was performed.  I did review his medicines I can see that he is on Xanax Tylenol Adderall, aspirin, digoxin, Lasix, Toprol all, Entresto, Aldactone, Flomax, Ambien and vitamin D.  I also saw cardiology note from 6/26/2024 I can see that the patient was on digoxin metoprolol.  There is mention that this patient does have ischemic cardiomyopathy had an echo on 7/10/2023 that showed an ejection fraction of 24%.    PAST MEDICAL HISTORY  Active Ambulatory Problems     Diagnosis Date Noted    Paresthesia 01/26/2022    Hypokalemia 01/26/2022    Acute on chronic combined  systolic and diastolic CHF (congestive heart failure) 02/13/2024    History of pneumonia 02/13/2024    DM (diabetes mellitus), type 2 02/13/2024    Hyperlipemia 02/13/2024    CAD (coronary artery disease) 02/13/2024    Cough 02/13/2024     Resolved Ambulatory Problems     Diagnosis Date Noted    TIA (transient ischemic attack) 01/26/2022     Past Medical History:   Diagnosis Date    Anxiety     Asthma     Chronic systolic CHF (congestive heart failure)     Depression     Diabetes mellitus     Diastolic dysfunction     Hyperlipidemia     Hypertension     Ischemic cardiomyopathy     Mitral valve regurgitation     Myocardial infarction     NSTEMI (non-ST elevated myocardial infarction)     Prostatic hypertrophy     Seasonal allergies          PAST SURGICAL HISTORY  Past Surgical History:   Procedure Laterality Date    CARDIAC SURGERY      CABG 4 vessel 2010         FAMILY HISTORY  Family History   Problem Relation Age of Onset    No Known Problems Mother     No Known Problems Father     No Known Problems Sister          SOCIAL HISTORY  Social History     Socioeconomic History    Marital status: Single   Tobacco Use    Smoking status: Never   Substance and Sexual Activity    Alcohol use: No    Drug use: No         ALLERGIES  Metformin        REVIEW OF SYSTEMS  Review of Systems     All systems reviewed and negative except for those discussed in HPI.       PHYSICAL EXAM    I have reviewed the triage vital signs and nursing notes.    ED Triage Vitals [06/30/24 1248]   Temp Heart Rate Resp BP SpO2   -- 115 (!) 29 119/85 95 %      Temp src Heart Rate Source Patient Position BP Location FiO2 (%)   -- Monitor -- -- --       GENERAL: This is a male that looks older than his stated age.  He is in acute respiratory distress .Vital signs on my initial evaluation have been reviewed he is tachypneic with his respiratory rate between 25 and 30.  His oxygen saturation is 93% on room air.  He has audible congestion.  He is afebrile.   Heart rate is a between 115 and 125 and a sinus tachycardia.  HENT: nares patent  Head/neck/ face are symmetric without gross deformity, signs of trauma, or swelling  EYES: no scleral icterus, no conjunctival pallor.  NECK: Supple, no meningismus  CV: regular rhythm, tachycardia with heart rate in the low 100s.  With intact distal pulses.  Distant heart sounds.  RESPIRATORY: normal effort and no respiratory distress.  Rales in his posterior lung fields bilaterally with decreased breath sounds in the bases bilaterally.  ABDOMEN: soft and nontender.  Normal inspection  MUSCULOSKELETAL: no deformity.  1+ edema to bilateral lower extremities.  Patient has intact distal pulses that are equal strong and symmetric to upper and lower extremities bilaterally  NEURO: alert and appropriate, moves all extremities, follows commands.  No focal motor or sensory changes  SKIN: warm, dry    Vital signs and nursing notes reviewed.        LAB RESULTS  Recent Results (from the past 24 hour(s))   Comprehensive Metabolic Panel    Collection Time: 06/30/24 12:59 PM    Specimen: Arm, Right; Blood   Result Value Ref Range    Glucose 170 (H) 65 - 99 mg/dL    BUN 23 8 - 23 mg/dL    Creatinine 1.32 (H) 0.76 - 1.27 mg/dL    Sodium 134 (L) 136 - 145 mmol/L    Potassium 3.7 3.5 - 5.2 mmol/L    Chloride 103 98 - 107 mmol/L    CO2 16.6 (L) 22.0 - 29.0 mmol/L    Calcium 8.8 8.6 - 10.5 mg/dL    Total Protein 6.3 6.0 - 8.5 g/dL    Albumin 3.8 3.5 - 5.2 g/dL    ALT (SGPT) 232 (H) 1 - 41 U/L    AST (SGOT) 145 (H) 1 - 40 U/L    Alkaline Phosphatase 74 39 - 117 U/L    Total Bilirubin 1.6 (H) 0.0 - 1.2 mg/dL    Globulin 2.5 gm/dL    A/G Ratio 1.5 g/dL    BUN/Creatinine Ratio 17.4 7.0 - 25.0    Anion Gap 14.4 5.0 - 15.0 mmol/L    eGFR 61.0 >60.0 mL/min/1.73   Protime-INR    Collection Time: 06/30/24 12:59 PM    Specimen: Arm, Right; Blood   Result Value Ref Range    Protime 20.0 (H) 11.7 - 14.2 Seconds    INR 1.68 (H) 0.90 - 1.10   BNP    Collection Time:  06/30/24 12:59 PM    Specimen: Arm, Right; Blood   Result Value Ref Range    proBNP 3,013.0 (H) 0.0 - 900.0 pg/mL   High Sensitivity Troponin T    Collection Time: 06/30/24 12:59 PM    Specimen: Arm, Right; Blood   Result Value Ref Range    HS Troponin T 85 (C) <22 ng/L   Magnesium    Collection Time: 06/30/24 12:59 PM    Specimen: Arm, Right; Blood   Result Value Ref Range    Magnesium 1.6 1.6 - 2.4 mg/dL   CBC Auto Differential    Collection Time: 06/30/24 12:59 PM    Specimen: Arm, Right; Blood   Result Value Ref Range    WBC 5.78 3.40 - 10.80 10*3/mm3    RBC 4.32 4.14 - 5.80 10*6/mm3    Hemoglobin 13.0 13.0 - 17.7 g/dL    Hematocrit 40.6 37.5 - 51.0 %    MCV 94.0 79.0 - 97.0 fL    MCH 30.1 26.6 - 33.0 pg    MCHC 32.0 31.5 - 35.7 g/dL    RDW 15.3 12.3 - 15.4 %    RDW-SD 53.0 37.0 - 54.0 fl    MPV 10.7 6.0 - 12.0 fL    Platelets 164 140 - 450 10*3/mm3    Neutrophil % 80.8 (H) 42.7 - 76.0 %    Lymphocyte % 12.1 (L) 19.6 - 45.3 %    Monocyte % 6.6 5.0 - 12.0 %    Eosinophil % 0.0 (L) 0.3 - 6.2 %    Basophil % 0.0 0.0 - 1.5 %    Immature Grans % 0.5 0.0 - 0.5 %    Neutrophils, Absolute 4.67 1.70 - 7.00 10*3/mm3    Lymphocytes, Absolute 0.70 0.70 - 3.10 10*3/mm3    Monocytes, Absolute 0.38 0.10 - 0.90 10*3/mm3    Eosinophils, Absolute 0.00 0.00 - 0.40 10*3/mm3    Basophils, Absolute 0.00 0.00 - 0.20 10*3/mm3    Immature Grans, Absolute 0.03 0.00 - 0.05 10*3/mm3    nRBC 0.0 0.0 - 0.2 /100 WBC   Digoxin Level    Collection Time: 06/30/24 12:59 PM    Specimen: Arm, Right; Blood   Result Value Ref Range    Digoxin <0.30 (L) 0.60 - 1.20 ng/mL   Ethanol    Collection Time: 06/30/24 12:59 PM    Specimen: Arm, Right; Blood   Result Value Ref Range    Ethanol <10 0 - 10 mg/dL    Ethanol % <0.010 %   ECG 12 Lead Dyspnea    Collection Time: 06/30/24  1:05 PM   Result Value Ref Range    QT Interval 334 ms    QTC Interval 469 ms   Blood Gas, Arterial -    Collection Time: 06/30/24  4:07 PM    Specimen: Arterial Blood   Result  Value Ref Range    Site Left Radial     Mark's Test Positive     pH, Arterial 7.420 7.350 - 7.450 pH units    pCO2, Arterial 29.0 (L) 35.0 - 45.0 mm Hg    pO2, Arterial 89.9 80.0 - 100.0 mm Hg    HCO3, Arterial 18.8 (L) 22.0 - 28.0 mmol/L    Base Excess, Arterial -4.4 (L) 0.0 - 2.0 mmol/L    O2 Saturation, Arterial 97.3 92.0 - 98.5 %    CO2 Content 19.7 (L) 23 - 27 mmol/L    Barometric Pressure for Blood Gas 750.7000 mmHg    Modality Cannula     Flow Rate 2.0000 lpm    Rate 18 Breaths/minute    Hemodilution No        Ordered the above labs and independently reviewed the results.        RADIOLOGY  CT Angiogram Chest    Result Date: 6/30/2024  CT ANGIOGRAM OF THE CHEST. MULTIPLE CORONAL, SAGITTAL, AND 3-D RECONSTRUCTIONS.  HISTORY: 63-year-old male with shortness of breath.  TECHNIQUE: Radiation dose reduction techniques were utilized, including automated exposure control and exposure modulation based on body size. CT angiogram of the chest was performed following the administration of IV contrast. Multiple coronal, sagittal, and 3-D reconstruction images were obtained. Compared with prior 2/13/2024.  FINDINGS: 1. There is very heterogeneous admixture of contrast within the pulmonary arteries. There is a small linear low-attenuation focus at the left main pulmonary artery which is suspected to be artifact and related to heterogeneous admixture, but a tiny thrombus, although considered unlikely, can't be entirely excluded, series 5, image 56. There is otherwise no pulmonary thromboemboli bilaterally.  2. There is a moderate size right pleural effusion and a small-moderate left pleural effusion which layers to the lung apices. There is mild interstitial edema and some haziness in both lungs likely representing edema. There is also mild 3rd spacing within the body wall.  There are a few very small patchy groundglass opacities at the lateral right apex which are indeterminate and may represent components of edema, but  small infectious infiltrates can't be excluded, series 6 image 29. There were similar but more prominent similar appearing airspace opacities previously at the upper lobes which have resolved.  3. There is no pericardial effusion. There is no definitive lymphadenopathy within the chest. Mediastinal nodes appear slightly more plump than previously, but nodes typically enlarged in the setting of CHF.  4. Incidentally noted is cholelithiasis without convincing evidence for cholecystitis.      XR Chest 1 View    Result Date: 6/30/2024  XR CHEST 1 VW-  HISTORY: Male who is 63 years-old, short of breath  TECHNIQUE: Frontal view of the chest  COMPARISON: 2/13/2024  FINDINGS: The heart is enlarged. Pulmonary vasculature is unremarkable. Sternotomy wires are present. No focal pulmonary consolidation, pleural effusion, or pneumothorax. No acute osseous process.      No focal pulmonary consolidation. Cardiomegaly. Follow-up as clinical indications persist.  This report was finalized on 6/30/2024 2:03 PM by Dr. Damien Suárez M.D on Workstation: [id number]       I ordered the above noted radiological studies. Reviewed by me and discussed with radiologist.  See dictation for official radiology interpretation.      PROCEDURES    Procedures      MEDICATIONS GIVEN IN ER    Medications   sodium chloride 0.9 % flush 10 mL (has no administration in time range)   furosemide (LASIX) injection 40 mg (40 mg Intravenous Given 6/30/24 1556)   iopamidol (ISOVUE-370) 76 % injection 95 mL (95 mL Intravenous Given 6/30/24 1539)   diazePAM (VALIUM) injection 2.5 mg (2.5 mg Intravenous Given 6/30/24 1629)         All labs have been independently reviewed by me.  All radiology studies have been reviewed by me and I discussed with radiologist dictating the report when indicated below.  All EKG's independently viewed and interpreted by me.  Discussion below represents my analysis of pertinent findings related to patient's condition, differential  diagnosis, treatment plan and final disposition.        PROGRESS, DATA ANALYSIS, CONSULTS, AND MEDICAL DECISION MAKING  DDx includes CHF, acute coronary syndrome, pulmonary embolism, pneumothorax, pneumonia, asthma/COPD,aspiration,  pulmonary hypertension, metabolic acidosis, deconditioning, anemia, other hematologic etiologies such as CO poisoning, anxiety.     This is a gentleman that states that his heart rate is always fast at about 110.  He states its from his previous bypass and heart injury.  He has been noncompliant with Lasix and feels that he is in acute congestive heart failure again.  Based upon my clinical exam and reviewing old records and reviewing the recent visit to the emergency department Raghavendra's I suspect that congestive heart failure is very likely the etiology of her symptoms complicated by his medical noncompliance.  Informed of the test that we will order.  All questions answered at this time.      ED Course as of 06/30/24 1637   Sun Jun 30, 2024   1346 proBNP(!): 3,013.0 [MM]   1346 HS Troponin T(!!): 85  Patient is in heart failure.  This is likely the cause of the elevation of his troponin.  He is not having any acute chest pain.  I do not see any obvious acute injury pattern on his current EKG. [MM]   1346 My own independent or potation of the EKG that was done at 1:05 PM reveals a rate of 118 with a PVC has appearance of a sinus tachycardia  He has intraventricular conduction delay is nonspecific.  Normal axis.  He has Q waves in V1 and V2 I do not see any definitive acute injury pattern.  I reviewed his previous EKG from 2/13/2024 and it looks fairly similar. [MM]   1348 Previous troponins have been 68, 79, 92 over the past 4 to 5 months.  He has known ischemic cardiomyopathy [MM]   1348 proBNP(!): 3,013.0  Elevated from his previous BNP is over the past several months. [MM]   1447 I reviewed the radiologist interpretation.  He mentions cardiomegaly with no focal pulmonary  consolidation or pneumothorax.  He mentions pulmonary vasculature looks unremarkable.  Please see complete dictated report from radiologist [MM]   1447 This gentleman had a CT scan 2 days ago which did not show a PE.  He is having peripheral edema his BNP is up he is got known ischemic cardiomyopathy I anticipate his cause of symptoms are very likely fluid overload. [MM]   1452 Patient's sister arrived.  Talked with her as well as the patient.  The patient does agree to stay.  She is well aware of him leaving AGAINST MEDICAL ADVICE.  He is well aware of his current condition.  He has been offered a defibrillator in the past which she is refused.  He is again not been taking his medicines as prescribed has not had any diuretics for the past 2 to 3 days.  He has been gaining weight and increased swelling.  Informed him as well as his sister of the serious condition.  All questions answered at this time. [MM]   1502 I did discuss the case with Dr. Romero who is on for [facility].  Informed of the patient's presenting symptoms.  Agrees with admission.  Will give him Lasix. [MM]   1503 1 concern that we have is the fact that this patient presents as if he is fluid overloaded.  He is got edema he has been noncompliant with Lasix.  But the chest x-ray does not show any active vascular congestion.  Patient's GFR is 61.  Will go ahead and do a CT angiogram of this chest [MM]   1538 To clarify further patient again tells me that his heart rate is always elevated about 110.  He said he has had this since previous bypass surgery. [MM]   1548 Patient definitely has some anxiety occurring.  Requesting medicine for anxiety.  He normally takes Adderall for his anxiety.  He takes Ambien at night as well. [MM]   1613 pH, Arterial: 7.420 [MM]   1613 pCO2, Arterial(!): 29.0 [MM]   1613 pO2, Arterial: 89.9  This is gentleman he is having a little bit of hyperventilation.  He has a history of anxiety and is requesting medicine to help relax him.   I really do not want to give him Adderall as he is tachycardic.  I will give him a low-dose of Valium. [MM]   1624 I reviewed the CT angiogram report.  There is very heterogeneous admixture of contrast within the pulmonary arteries there is a small linear low-attenuation focus at the left main pulmonary artery which is suspected to be artifact.  Cannot rule out a tiny thrombus.  There is moderate size right pleural effusion and a small moderate left pleural effusion there is mild interstitial edema and some haziness in both lungs likely representing edema.  There is third spacing within the body wall as well there is other findings that are suggestive of edema as well.  No pericardial effusion.  No definitive lymphadenopathy.  Please see complete dictated report from radiologist.  Clinically I do not believe this patient has a pulmonary embolism embolism.  This is likely fluid overload given the patient's presenting symptoms and results of the test. [MM]      ED Course User Index  [MM] Abundio Amaya MD       AS OF 16:37 EDT VITALS:    BP - 105/84  HR - 112  TEMP - 97.6 °F (36.4 °C) (Tympanic)  02 SATS - 96%    SOCIAL DETERMINANTS OF HEALTH THAT IMPACT OR LIMIT CARE (For example..Homelessness,safe discharge, inability to obtain care, follow up, or prescriptions):  \    DIAGNOSIS  Final diagnoses:   Acute on chronic congestive heart failure, unspecified heart failure type   Ischemic cardiomyopathy   History of noncompliance with medical treatment, presenting hazards to health   Troponin level elevated         DISPOSITION  I have reviewed the test results with my patient and explained the current treatment plan.  I answered all of the patient's questions.  The patient will be admitted to monitor bed at this time.  The patient is not hypotensive and is tolerating their current disease condition well enough for a monitored bed at this time.  The patient's current condition does not require intensive care treatment at  this time.            DICTATED UTILIZING DRAGON DICTATION    Note Disclaimer: At [facility], we believe that sharing information builds trust and better relationships. You are receiving this note because you recently visited [facility]. It is possible you will see health information before a provider has talked with you about it. This kind of information can be easy to misunderstand. To help you fully understand what it means for your health, we urge you to discuss this note with your provider.       Abundio Amaya MD  06/30/24 1924

## 2024-07-01 LAB
QT INTERVAL: 334 MS
QTC INTERVAL: 469 MS

## 2024-07-01 NOTE — NURSING NOTE
"Order for [facility] Center acknowledged. Reviewed chart and walked over to 4N to speak with patient and he was putting his shoes on and stated \"I'm going home\". Patient stated \"there's nothing more they are going to do for me tonight so I'm leaving\". RN walked in room with AMA form. Encouraged patient to remain in the hospital but he declined.   "

## 2024-07-01 NOTE — CASE MANAGEMENT/SOCIAL WORK
Case Management Discharge Note                Selected Continued Care - Discharged on 6/30/2024 Admission date: 6/30/2024 - Discharge disposition: Left Against Medical Advice      Destination    No services have been selected for the patient.                Durable Medical Equipment    No services have been selected for the patient.                Dialysis/Infusion    No services have been selected for the patient.                Home Medical Care    No services have been selected for the patient.                Therapy    No services have been selected for the patient.                Community Resources    No services have been selected for the patient.                Community & DME    No services have been selected for the patient.                         Final Discharge Disposition Code: 07 - left AMA

## 2024-07-01 NOTE — NURSING NOTE
Pt stating he didn't like to be away from home.  Nurse talked with pt about need for medications and oxygen for SOA. Pt states nothing would change his mind he was leaving and going home. Pt called friend Ronal to come get him. Pt signed AMA form.  Nurse walked with pt to ER.

## 2024-07-01 NOTE — NURSING NOTE
Pt tearful since shift change. Pt reports having panic attack. Pt stating thnking about going home due to anxiety.  PRN med given at 2000. NP called per pt request for more medication.  Consult to access center.